# Patient Record
Sex: FEMALE | Race: ASIAN | Employment: FULL TIME | URBAN - METROPOLITAN AREA
[De-identification: names, ages, dates, MRNs, and addresses within clinical notes are randomized per-mention and may not be internally consistent; named-entity substitution may affect disease eponyms.]

---

## 2017-06-14 PROBLEM — E78.2 MIXED HYPERLIPIDEMIA: Status: ACTIVE | Noted: 2017-06-14

## 2017-06-14 PROBLEM — J45.20 MILD INTERMITTENT ASTHMA WITHOUT COMPLICATION: Status: ACTIVE | Noted: 2017-06-14

## 2017-06-14 PROCEDURE — 88175 CYTOPATH C/V AUTO FLUID REDO: CPT | Performed by: PHYSICIAN ASSISTANT

## 2017-06-14 PROCEDURE — 87591 N.GONORRHOEAE DNA AMP PROB: CPT | Performed by: PHYSICIAN ASSISTANT

## 2017-06-14 PROCEDURE — 87491 CHLMYD TRACH DNA AMP PROBE: CPT | Performed by: PHYSICIAN ASSISTANT

## 2017-06-14 PROCEDURE — 87624 HPV HI-RISK TYP POOLED RSLT: CPT | Performed by: PHYSICIAN ASSISTANT

## 2017-09-15 PROBLEM — M67.89 GANGLION AND CYST OF SYNOVIUM, TENDON AND BURSA: Status: ACTIVE | Noted: 2017-09-15

## 2017-09-15 PROBLEM — M23.92 INTERNAL DERANGEMENT OF LEFT KNEE: Status: ACTIVE | Noted: 2017-09-15

## 2017-09-15 PROBLEM — M67.49 GANGLION AND CYST OF SYNOVIUM, TENDON AND BURSA: Status: ACTIVE | Noted: 2017-09-15

## 2017-09-15 PROBLEM — M71.39 GANGLION AND CYST OF SYNOVIUM, TENDON AND BURSA: Status: ACTIVE | Noted: 2017-09-15

## 2017-10-06 PROBLEM — Z47.89 ORTHOPEDIC AFTERCARE: Status: ACTIVE | Noted: 2017-10-06

## 2018-01-26 PROCEDURE — 87081 CULTURE SCREEN ONLY: CPT | Performed by: PHYSICIAN ASSISTANT

## 2018-01-28 ENCOUNTER — HOSPITAL ENCOUNTER (EMERGENCY)
Facility: HOSPITAL | Age: 23
Discharge: HOME | End: 2018-01-28
Attending: EMERGENCY MEDICINE
Payer: COMMERCIAL

## 2018-01-28 ENCOUNTER — APPOINTMENT (OUTPATIENT)
Dept: GENERAL RADIOLOGY | Facility: HOSPITAL | Age: 23
End: 2018-01-28
Payer: COMMERCIAL

## 2018-01-28 VITALS
WEIGHT: 153 LBS | RESPIRATION RATE: 14 BRPM | BODY MASS INDEX: 30.04 KG/M2 | HEIGHT: 60 IN | DIASTOLIC BLOOD PRESSURE: 88 MMHG | HEART RATE: 102 BPM | SYSTOLIC BLOOD PRESSURE: 127 MMHG | TEMPERATURE: 99 F | OXYGEN SATURATION: 100 %

## 2018-01-28 DIAGNOSIS — J11.1 FLU SYNDROME: Primary | ICD-10-CM

## 2018-01-28 LAB
ADENOVIRUS PCR:: NEGATIVE
ALBUMIN SERPL-MCNC: 3.5 G/DL (ref 3.5–4.8)
ALP LIVER SERPL-CCNC: 139 U/L (ref 52–144)
ALT SERPL-CCNC: 65 U/L (ref 14–54)
AST SERPL-CCNC: 37 U/L (ref 15–41)
B PERT DNA SPEC QL NAA+PROBE: NEGATIVE
BASOPHILS # BLD AUTO: 0.02 X10(3) UL (ref 0–0.1)
BASOPHILS NFR BLD AUTO: 0.1 %
BILIRUB SERPL-MCNC: 0.6 MG/DL (ref 0.1–2)
BILIRUB UR QL STRIP.AUTO: NEGATIVE
BUN BLD-MCNC: 8 MG/DL (ref 8–20)
C PNEUM DNA SPEC QL NAA+PROBE: NEGATIVE
CALCIUM BLD-MCNC: 9.2 MG/DL (ref 8.3–10.3)
CHLORIDE: 100 MMOL/L (ref 101–111)
CLARITY UR REFRACT.AUTO: CLEAR
CO2: 25 MMOL/L (ref 22–32)
COLOR UR AUTO: YELLOW
CORONAVIRUS 229E PCR:: NEGATIVE
CORONAVIRUS HKU1 PCR:: NEGATIVE
CORONAVIRUS NL63 PCR:: NEGATIVE
CORONAVIRUS OC43 PCR:: NEGATIVE
CREAT BLD-MCNC: 0.67 MG/DL (ref 0.55–1.02)
EOSINOPHIL # BLD AUTO: 0 X10(3) UL (ref 0–0.3)
EOSINOPHIL NFR BLD AUTO: 0 %
ERYTHROCYTE [DISTWIDTH] IN BLOOD BY AUTOMATED COUNT: 12 % (ref 11.5–16)
FLUAV RNA SPEC QL NAA+PROBE: NEGATIVE
FLUBV RNA SPEC QL NAA+PROBE: NEGATIVE
GLUCOSE BLD-MCNC: 93 MG/DL (ref 70–99)
GLUCOSE UR STRIP.AUTO-MCNC: NEGATIVE MG/DL
HCT VFR BLD AUTO: 37.9 % (ref 34–50)
HGB BLD-MCNC: 13.1 G/DL (ref 12–16)
IMMATURE GRANULOCYTE COUNT: 0.04 X10(3) UL (ref 0–1)
IMMATURE GRANULOCYTE RATIO %: 0.3 %
KETONES UR STRIP.AUTO-MCNC: 20 MG/DL
LEUKOCYTE ESTERASE UR QL STRIP.AUTO: NEGATIVE
LYMPHOCYTES # BLD AUTO: 2.14 X10(3) UL (ref 0.9–4)
LYMPHOCYTES NFR BLD AUTO: 15.4 %
M PROTEIN MFR SERPL ELPH: 9 G/DL (ref 6.1–8.3)
MCH RBC QN AUTO: 31.1 PG (ref 27–33.2)
MCHC RBC AUTO-ENTMCNC: 34.6 G/DL (ref 31–37)
MCV RBC AUTO: 90 FL (ref 81–100)
METAPNEUMOVIRUS PCR:: NEGATIVE
MONOCYTES # BLD AUTO: 1.1 X10(3) UL (ref 0.1–0.6)
MONOCYTES NFR BLD AUTO: 7.9 %
MYCOPLASMA PNEUMONIA PCR:: NEGATIVE
NEUTROPHIL ABS PRELIM: 10.62 X10 (3) UL (ref 1.3–6.7)
NEUTROPHILS # BLD AUTO: 10.62 X10(3) UL (ref 1.3–6.7)
NEUTROPHILS NFR BLD AUTO: 76.3 %
NITRITE UR QL STRIP.AUTO: NEGATIVE
PARAINFLUENZA 1 PCR:: NEGATIVE
PARAINFLUENZA 2 PCR:: NEGATIVE
PARAINFLUENZA 3 PCR:: NEGATIVE
PARAINFLUENZA 4 PCR:: NEGATIVE
PH UR STRIP.AUTO: 6 [PH] (ref 4.5–8)
PLATELET # BLD AUTO: 368 10(3)UL (ref 150–450)
POCT URINE PREGNANCY: NEGATIVE
POTASSIUM SERPL-SCNC: 3.9 MMOL/L (ref 3.6–5.1)
PROT UR STRIP.AUTO-MCNC: NEGATIVE MG/DL
RBC # BLD AUTO: 4.21 X10(6)UL (ref 3.8–5.1)
RBC UR QL AUTO: NEGATIVE
RED CELL DISTRIBUTION WIDTH-SD: 38.5 FL (ref 35.1–46.3)
RHINOVIRUS/ENTERO PCR:: NEGATIVE
RSV RNA SPEC QL NAA+PROBE: NEGATIVE
SODIUM SERPL-SCNC: 135 MMOL/L (ref 136–144)
SP GR UR STRIP.AUTO: 1.01 (ref 1–1.03)
UROBILINOGEN UR STRIP.AUTO-MCNC: <2 MG/DL
WBC # BLD AUTO: 13.9 X10(3) UL (ref 4–13)

## 2018-01-28 PROCEDURE — 87798 DETECT AGENT NOS DNA AMP: CPT | Performed by: EMERGENCY MEDICINE

## 2018-01-28 PROCEDURE — 99283 EMERGENCY DEPT VISIT LOW MDM: CPT

## 2018-01-28 PROCEDURE — 87633 RESP VIRUS 12-25 TARGETS: CPT | Performed by: EMERGENCY MEDICINE

## 2018-01-28 PROCEDURE — 85025 COMPLETE CBC W/AUTO DIFF WBC: CPT | Performed by: EMERGENCY MEDICINE

## 2018-01-28 PROCEDURE — 99284 EMERGENCY DEPT VISIT MOD MDM: CPT

## 2018-01-28 PROCEDURE — 87581 M.PNEUMON DNA AMP PROBE: CPT | Performed by: EMERGENCY MEDICINE

## 2018-01-28 PROCEDURE — 81025 URINE PREGNANCY TEST: CPT

## 2018-01-28 PROCEDURE — 87486 CHLMYD PNEUM DNA AMP PROBE: CPT | Performed by: EMERGENCY MEDICINE

## 2018-01-28 PROCEDURE — 80053 COMPREHEN METABOLIC PANEL: CPT | Performed by: EMERGENCY MEDICINE

## 2018-01-28 PROCEDURE — 87999 UNLISTED MICROBIOLOGY PX: CPT

## 2018-01-28 PROCEDURE — 36415 COLL VENOUS BLD VENIPUNCTURE: CPT

## 2018-01-28 PROCEDURE — 81003 URINALYSIS AUTO W/O SCOPE: CPT | Performed by: EMERGENCY MEDICINE

## 2018-01-28 PROCEDURE — 71046 X-RAY EXAM CHEST 2 VIEWS: CPT | Performed by: EMERGENCY MEDICINE

## 2018-01-28 RX ORDER — ONDANSETRON 4 MG/1
4 TABLET, ORALLY DISINTEGRATING ORAL EVERY 8 HOURS PRN
Qty: 10 TABLET | Refills: 0 | Status: SHIPPED | OUTPATIENT
Start: 2018-01-28 | End: 2018-02-04

## 2018-01-28 RX ORDER — OSELTAMIVIR PHOSPHATE 75 MG/1
75 CAPSULE ORAL 2 TIMES DAILY
Qty: 10 CAPSULE | Refills: 0 | Status: SHIPPED | OUTPATIENT
Start: 2018-01-28 | End: 2018-02-02

## 2018-01-28 NOTE — ED PROVIDER NOTES
Patient Seen in: [facility] Emergency Department    History   Patient presents with:  Fever (infectious)    Stated Complaint: flu like symptoms    HPI    The patient is a 25-year-old female presenting to the emergency department due to flulike sympt LMP 01/02/2018 (Exact Date)   SpO2 100%   Breastfeeding? No   BMI 29.88 kg/m²         Physical Exam  General: Nontoxic well-developed, well-nourished, pleasant, conversant adult female who is alert and oriented in no distress but intermittently coughing. view results for these tests on the individual orders.    URINALYSIS WITH CULTURE REFLEX   POCT PREGNANCY, URINE   RESPIRATORY PANEL FLU EXPANDED       ED Course as of Jan 28 0328  ------------------------------------------------------------  Blood was obta

## 2018-01-28 NOTE — ED INITIAL ASSESSMENT (HPI)
Pt arrives with flu like symptoms since Thursday, +fevers, chills, body aches, nausea and vomiting. Last dose ibuprofen 200mg at 2130.

## 2018-03-19 PROCEDURE — 87147 CULTURE TYPE IMMUNOLOGIC: CPT | Performed by: NURSE PRACTITIONER

## 2018-03-19 PROCEDURE — 87081 CULTURE SCREEN ONLY: CPT | Performed by: NURSE PRACTITIONER

## 2018-05-17 PROCEDURE — 87081 CULTURE SCREEN ONLY: CPT | Performed by: NURSE PRACTITIONER

## 2018-10-03 PROCEDURE — 87491 CHLMYD TRACH DNA AMP PROBE: CPT | Performed by: FAMILY MEDICINE

## 2018-10-03 PROCEDURE — 87591 N.GONORRHOEAE DNA AMP PROB: CPT | Performed by: FAMILY MEDICINE

## 2018-11-02 PROBLEM — R59.0 CERVICAL LYMPHADENOPATHY: Status: ACTIVE | Noted: 2018-11-02

## 2018-11-02 PROBLEM — R22.1 MASS OF RIGHT SIDE OF NECK: Status: ACTIVE | Noted: 2018-11-02

## 2018-11-07 PROCEDURE — 88307 TISSUE EXAM BY PATHOLOGIST: CPT | Performed by: OTOLARYNGOLOGY

## 2018-11-07 PROCEDURE — 88342 IMHCHEM/IMCYTCHM 1ST ANTB: CPT | Performed by: OTOLARYNGOLOGY

## 2018-11-07 PROCEDURE — 88185 FLOWCYTOMETRY/TC ADD-ON: CPT | Performed by: OTOLARYNGOLOGY

## 2018-11-07 PROCEDURE — 88184 FLOWCYTOMETRY/ TC 1 MARKER: CPT | Performed by: OTOLARYNGOLOGY

## 2018-11-07 PROCEDURE — 88341 IMHCHEM/IMCYTCHM EA ADD ANTB: CPT | Performed by: OTOLARYNGOLOGY

## 2019-01-22 ENCOUNTER — APPOINTMENT (OUTPATIENT)
Dept: OTHER | Facility: HOSPITAL | Age: 24
End: 2019-01-22
Attending: PREVENTIVE MEDICINE

## 2019-01-25 ENCOUNTER — APPOINTMENT (OUTPATIENT)
Dept: OTHER | Facility: HOSPITAL | Age: 24
End: 2019-01-25
Attending: PREVENTIVE MEDICINE

## 2019-04-30 PROBLEM — M70.62 TROCHANTERIC BURSITIS OF LEFT HIP: Status: ACTIVE | Noted: 2019-04-30

## 2019-05-07 ENCOUNTER — HOSPITAL ENCOUNTER (OUTPATIENT)
Dept: CT IMAGING | Facility: HOSPITAL | Age: 24
Discharge: HOME | End: 2019-05-07
Attending: PREVENTIVE MEDICINE
Payer: OTHER MISCELLANEOUS

## 2019-05-07 ENCOUNTER — OFFICE VISIT (OUTPATIENT)
Dept: OTHER | Facility: HOSPITAL | Age: 24
End: 2019-05-07
Attending: PREVENTIVE MEDICINE
Payer: OTHER MISCELLANEOUS

## 2019-05-07 DIAGNOSIS — Z87.820 SIGNIFICANT CLOSED HEAD TRAUMA WITHIN PAST 3 MONTHS: Primary | ICD-10-CM

## 2019-05-07 PROCEDURE — 70486 CT MAXILLOFACIAL W/O DYE: CPT | Performed by: PREVENTIVE MEDICINE

## 2019-05-07 PROCEDURE — 76377 3D RENDER W/INTRP POSTPROCES: CPT | Performed by: PREVENTIVE MEDICINE

## 2019-05-07 PROCEDURE — 70450 CT HEAD/BRAIN W/O DYE: CPT | Performed by: PREVENTIVE MEDICINE

## 2019-05-07 NOTE — PATIENT INSTRUCTIONS
Rest, eat lightly    Ice to face or head as needed  ( 20 minutes at a time)    Off work until [employer] injury instructions:      Head Injury (Adult)    You have a head injury. It does not appear serious at this time.  But symptoms of a more serious ? Don’t return to sports or other activities that could result in another head injury. Follow-up care  Follow up with your healthcare provider, or as directed. If imaging tests were done, they will be reviewed by a doctor.  You will be told the results and · Inability to be awakened  · Stiff neck  · Weakness or numbness in any part of the body  · Seizures  General care  · If you were prescribed medicines for pain, use them as directed.  Note: Don’t take other medicines for pain without talking to your provide © 3304-3675 [employer] [address] [facility] [place] [place]. All rights reserved. This information is not intended as a substitute for professional medical care. Always follow your healthcare professional's instructions.

## 2019-05-08 ENCOUNTER — APPOINTMENT (OUTPATIENT)
Dept: OTHER | Facility: HOSPITAL | Age: 24
End: 2019-05-08
Attending: PREVENTIVE MEDICINE
Payer: OTHER MISCELLANEOUS

## 2019-05-10 ENCOUNTER — APPOINTMENT (OUTPATIENT)
Dept: OTHER | Facility: HOSPITAL | Age: 24
End: 2019-05-10
Attending: FAMILY MEDICINE
Payer: OTHER MISCELLANEOUS

## 2019-05-15 ENCOUNTER — APPOINTMENT (OUTPATIENT)
Dept: OTHER | Facility: HOSPITAL | Age: 24
End: 2019-05-15
Attending: FAMILY MEDICINE
Payer: OTHER MISCELLANEOUS

## 2019-05-22 ENCOUNTER — APPOINTMENT (OUTPATIENT)
Dept: OTHER | Facility: HOSPITAL | Age: 24
End: 2019-05-22
Attending: PREVENTIVE MEDICINE
Payer: OTHER MISCELLANEOUS

## 2019-11-12 ENCOUNTER — OFFICE VISIT (OUTPATIENT)
Dept: OTHER | Facility: HOSPITAL | Age: 24
End: 2019-11-12
Attending: PREVENTIVE MEDICINE
Payer: OTHER MISCELLANEOUS

## 2019-11-12 ENCOUNTER — HOSPITAL ENCOUNTER (OUTPATIENT)
Dept: GENERAL RADIOLOGY | Facility: HOSPITAL | Age: 24
Discharge: HOME | End: 2019-11-12
Attending: PREVENTIVE MEDICINE
Payer: OTHER MISCELLANEOUS

## 2019-11-12 DIAGNOSIS — S60.219A WRIST CONTUSION: Primary | ICD-10-CM

## 2019-11-12 DIAGNOSIS — Z01.84 IMMUNITY STATUS TESTING: ICD-10-CM

## 2019-11-12 PROCEDURE — 86706 HEP B SURFACE ANTIBODY: CPT

## 2019-11-12 PROCEDURE — 73110 X-RAY EXAM OF WRIST: CPT | Performed by: PREVENTIVE MEDICINE

## 2019-11-14 ENCOUNTER — APPOINTMENT (OUTPATIENT)
Dept: OTHER | Facility: HOSPITAL | Age: 24
End: 2019-11-14
Attending: PREVENTIVE MEDICINE
Payer: OTHER MISCELLANEOUS

## 2019-12-26 ENCOUNTER — OFFICE VISIT (OUTPATIENT)
Dept: OTHER | Facility: HOSPITAL | Age: 24
End: 2019-12-26
Attending: PREVENTIVE MEDICINE
Payer: OTHER MISCELLANEOUS

## 2019-12-26 DIAGNOSIS — Z01.84 IMMUNITY STATUS TESTING: Primary | ICD-10-CM

## 2019-12-26 PROCEDURE — 86706 HEP B SURFACE ANTIBODY: CPT

## 2019-12-27 ENCOUNTER — APPOINTMENT (OUTPATIENT)
Dept: OTHER | Facility: HOSPITAL | Age: 24
End: 2019-12-27
Attending: PREVENTIVE MEDICINE

## 2020-01-22 ENCOUNTER — HOSPITAL ENCOUNTER (OUTPATIENT)
Dept: GENERAL RADIOLOGY | Facility: HOSPITAL | Age: 25
Discharge: HOME | End: 2020-01-22
Attending: PREVENTIVE MEDICINE

## 2020-01-22 ENCOUNTER — OFFICE VISIT (OUTPATIENT)
Dept: OTHER | Facility: HOSPITAL | Age: 25
End: 2020-01-22
Attending: PREVENTIVE MEDICINE

## 2020-01-22 DIAGNOSIS — M25.552 ACUTE HIP PAIN, LEFT: Primary | ICD-10-CM

## 2020-01-22 PROCEDURE — 73502 X-RAY EXAM HIP UNI 2-3 VIEWS: CPT | Performed by: PREVENTIVE MEDICINE

## 2020-01-24 ENCOUNTER — OCC HEALTH (OUTPATIENT)
Dept: OTHER | Facility: HOSPITAL | Age: 25
End: 2020-01-24
Attending: PREVENTIVE MEDICINE

## 2020-01-24 RX ORDER — HYDROCODONE BITARTRATE AND ACETAMINOPHEN 5; 325 MG/1; MG/1
TABLET ORAL
Qty: 15 TABLET | Refills: 0 | Status: SHIPPED | OUTPATIENT
Start: 2020-01-24 | End: 2020-08-17

## 2020-01-28 PROBLEM — S76.012A STRAIN OF LEFT HIP, INITIAL ENCOUNTER: Status: ACTIVE | Noted: 2020-01-28

## 2020-03-04 PROBLEM — Z47.89 ORTHOPEDIC AFTERCARE: Status: RESOLVED | Noted: 2017-10-06 | Resolved: 2020-03-04

## 2020-03-04 PROBLEM — S76.012A STRAIN OF LEFT HIP, INITIAL ENCOUNTER: Status: RESOLVED | Noted: 2020-01-28 | Resolved: 2020-03-04

## 2020-05-27 ENCOUNTER — APPOINTMENT (OUTPATIENT)
Dept: OTHER | Facility: HOSPITAL | Age: 25
End: 2020-05-27
Attending: FAMILY MEDICINE

## 2020-06-24 ENCOUNTER — OFFICE VISIT (OUTPATIENT)
Dept: OTHER | Facility: HOSPITAL | Age: 25
End: 2020-06-24
Attending: FAMILY MEDICINE

## 2020-06-24 DIAGNOSIS — Z01.84 IMMUNITY STATUS TESTING: Primary | ICD-10-CM

## 2020-06-24 PROCEDURE — 86706 HEP B SURFACE ANTIBODY: CPT

## 2021-03-31 ENCOUNTER — ORDER TRANSCRIPTION (OUTPATIENT)
Dept: ADMINISTRATIVE | Facility: HOSPITAL | Age: 26
End: 2021-03-31

## 2021-03-31 DIAGNOSIS — Z11.59 SCREENING EXAMINATION FOR ARTHROPOD-BORNE VIRAL DISEASE: ICD-10-CM

## 2021-03-31 DIAGNOSIS — Z01.818 PRE-OP TESTING: Primary | ICD-10-CM

## 2021-04-10 ENCOUNTER — LAB ENCOUNTER (OUTPATIENT)
Dept: LAB | Age: 26
End: 2021-04-10
Attending: OTOLARYNGOLOGY
Payer: COMMERCIAL

## 2021-04-10 DIAGNOSIS — Z01.818 PRE-OP TESTING: ICD-10-CM

## 2021-04-10 DIAGNOSIS — Z11.59 SCREENING EXAMINATION FOR ARTHROPOD-BORNE VIRAL DISEASE: ICD-10-CM

## 2021-04-13 ENCOUNTER — NURSE ONLY (OUTPATIENT)
Dept: LAB | Facility: HOSPITAL | Age: 26
End: 2021-04-13
Attending: OTOLARYNGOLOGY
Payer: COMMERCIAL

## 2021-04-13 ENCOUNTER — HOSPITAL ENCOUNTER (OUTPATIENT)
Dept: ULTRASOUND IMAGING | Facility: HOSPITAL | Age: 26
Discharge: HOME | End: 2021-04-13
Attending: OTOLARYNGOLOGY
Payer: COMMERCIAL

## 2021-04-13 DIAGNOSIS — R69 DIAGNOSIS UNKNOWN: Primary | ICD-10-CM

## 2021-04-13 DIAGNOSIS — R22.1 MASS OF RIGHT SIDE OF NECK: ICD-10-CM

## 2021-04-13 PROCEDURE — 88184 FLOWCYTOMETRY/ TC 1 MARKER: CPT

## 2021-04-13 PROCEDURE — 88173 CYTOPATH EVAL FNA REPORT: CPT | Performed by: OTOLARYNGOLOGY

## 2021-04-13 PROCEDURE — 88185 FLOWCYTOMETRY/TC ADD-ON: CPT

## 2021-04-13 PROCEDURE — 88305 TISSUE EXAM BY PATHOLOGIST: CPT | Performed by: OTOLARYNGOLOGY

## 2021-04-13 PROCEDURE — 88172 CYTP DX EVAL FNA 1ST EA SITE: CPT | Performed by: OTOLARYNGOLOGY

## 2021-04-13 PROCEDURE — 10005 FNA BX W/US GDN 1ST LES: CPT | Performed by: OTOLARYNGOLOGY

## 2021-04-28 ENCOUNTER — OCC HEALTH (OUTPATIENT)
Dept: OCCUPATIONAL MEDICINE | Age: 26
End: 2021-04-28
Attending: FAMILY MEDICINE

## 2021-04-28 ENCOUNTER — HOSPITAL ENCOUNTER (OUTPATIENT)
Dept: GENERAL RADIOLOGY | Age: 26
Discharge: HOME | End: 2021-04-28
Attending: FAMILY MEDICINE

## 2021-04-28 DIAGNOSIS — S99.911A RIGHT ANKLE INJURY, INITIAL ENCOUNTER: ICD-10-CM

## 2021-04-28 DIAGNOSIS — S99.921A RIGHT FOOT INJURY, INITIAL ENCOUNTER: ICD-10-CM

## 2021-04-28 DIAGNOSIS — S99.921A RIGHT FOOT INJURY, INITIAL ENCOUNTER: Primary | ICD-10-CM

## 2021-04-28 PROCEDURE — 73630 X-RAY EXAM OF FOOT: CPT | Performed by: FAMILY MEDICINE

## 2021-04-28 PROCEDURE — 73610 X-RAY EXAM OF ANKLE: CPT | Performed by: FAMILY MEDICINE

## 2021-05-12 ENCOUNTER — OFFICE VISIT (OUTPATIENT)
Dept: OCCUPATIONAL MEDICINE | Age: 26
End: 2021-05-12
Attending: FAMILY MEDICINE

## 2021-09-27 PROBLEM — R22.1 MASS OF RIGHT SIDE OF NECK: Status: RESOLVED | Noted: 2018-11-02 | Resolved: 2021-09-27

## 2021-09-27 PROBLEM — M23.92 INTERNAL DERANGEMENT OF LEFT KNEE: Status: RESOLVED | Noted: 2017-09-15 | Resolved: 2021-09-27

## 2021-10-19 ENCOUNTER — APPOINTMENT (OUTPATIENT)
Dept: CT IMAGING | Facility: HOSPITAL | Age: 26
End: 2021-10-19
Attending: EMERGENCY MEDICINE
Payer: COMMERCIAL

## 2021-10-19 ENCOUNTER — APPOINTMENT (OUTPATIENT)
Dept: GENERAL RADIOLOGY | Facility: HOSPITAL | Age: 26
End: 2021-10-19
Payer: COMMERCIAL

## 2021-10-19 ENCOUNTER — HOSPITAL ENCOUNTER (EMERGENCY)
Facility: HOSPITAL | Age: 26
Discharge: HOME | End: 2021-10-19
Attending: EMERGENCY MEDICINE
Payer: COMMERCIAL

## 2021-10-19 VITALS
DIASTOLIC BLOOD PRESSURE: 98 MMHG | RESPIRATION RATE: 22 BRPM | SYSTOLIC BLOOD PRESSURE: 148 MMHG | HEIGHT: 59 IN | OXYGEN SATURATION: 100 % | WEIGHT: 160 LBS | BODY MASS INDEX: 32.25 KG/M2 | HEART RATE: 60 BPM

## 2021-10-19 DIAGNOSIS — R07.9 CHEST PAIN OF UNCERTAIN ETIOLOGY: Primary | ICD-10-CM

## 2021-10-19 DIAGNOSIS — R79.89 ELEVATED LFTS: ICD-10-CM

## 2021-10-19 PROCEDURE — 71275 CT ANGIOGRAPHY CHEST: CPT | Performed by: EMERGENCY MEDICINE

## 2021-10-19 PROCEDURE — 85379 FIBRIN DEGRADATION QUANT: CPT | Performed by: EMERGENCY MEDICINE

## 2021-10-19 PROCEDURE — 99285 EMERGENCY DEPT VISIT HI MDM: CPT

## 2021-10-19 PROCEDURE — 84484 ASSAY OF TROPONIN QUANT: CPT | Performed by: EMERGENCY MEDICINE

## 2021-10-19 PROCEDURE — 71045 X-RAY EXAM CHEST 1 VIEW: CPT | Performed by: EMERGENCY MEDICINE

## 2021-10-19 PROCEDURE — 93010 ELECTROCARDIOGRAM REPORT: CPT

## 2021-10-19 PROCEDURE — 96374 THER/PROPH/DIAG INJ IV PUSH: CPT

## 2021-10-19 PROCEDURE — 85025 COMPLETE CBC W/AUTO DIFF WBC: CPT | Performed by: EMERGENCY MEDICINE

## 2021-10-19 PROCEDURE — 80053 COMPREHEN METABOLIC PANEL: CPT | Performed by: EMERGENCY MEDICINE

## 2021-10-19 PROCEDURE — 93005 ELECTROCARDIOGRAM TRACING: CPT

## 2021-10-19 RX ORDER — KETOROLAC TROMETHAMINE 30 MG/ML
15 INJECTION, SOLUTION INTRAMUSCULAR; INTRAVENOUS ONCE
Status: COMPLETED | OUTPATIENT
Start: 2021-10-19 | End: 2021-10-19

## 2021-10-19 NOTE — ED PROVIDER NOTES
CT ANGIOGRAPHY, CHEST (CPT=71275)    Result Date: 10/19/2021  CONCLUSION:   No evidence of pulmonary embolus or acute cardiopulmonary process.    Dictated by (CST): Beatriz Kowalski MD on 10/19/2021 at 3:40 PM     Finalized by (CST): Beatriz Kowalski MD on 10/19/

## 2021-10-19 NOTE — ED PROVIDER NOTES
Patient Seen in: [facility] Emergency Department      History   Patient presents with:  Chest Pain Angina  Difficulty Breathing    Stated Complaint: shortness of breath with chest pain and headache    Subjective:   HPI    Patient is a 59-year-old fe HEENT: Normocephalic, atraumatic. Moist mucous membranes. Pupils equal round reactive to light and accommodation, extraocular motion is intact, sclerae white, conjunctiva is pink. Oropharynx is unremarkable, no exudate.   NECK: Supple, trachea midline, 133  EKG    Rate, intervals and axes as noted on EKG Report.   Rate: 80  Rhythm: Sinus Rhythm  Reading: No acute changes              CT ANGIOGRAPHY, CHEST (CPT=71275)    Result Date: 10/19/2021  PROCEDURE:  CT ANGIOGRAPHY, CHEST (CPT=71275)  COMPARISON:  N 85 at TL  PATIENT STATED HISTORY: (As transcribed by Technologist)  Patient offered no additional history at this time. FINDINGS:  Normal heart size and pulmonary vascularity. No pleural effusion or pneumothorax. No lobar consolidation.             CONCL week            Medications Prescribed:  Discharge Medication List as of 10/19/2021  4:10 PM

## 2021-10-19 NOTE — ED INITIAL ASSESSMENT (HPI)
Pt c/o constant sharp left sided chest pain since last night. Pt States BP has also been high, reading at home was 140/100. Pt also c/o exertional dyspnea.

## 2022-05-13 ENCOUNTER — HOSPITAL ENCOUNTER (EMERGENCY)
Facility: HOSPITAL | Age: 27
Discharge: HOME | End: 2022-05-13
Attending: EMERGENCY MEDICINE
Payer: OTHER MISCELLANEOUS

## 2022-05-13 ENCOUNTER — APPOINTMENT (OUTPATIENT)
Dept: GENERAL RADIOLOGY | Facility: HOSPITAL | Age: 27
End: 2022-05-13
Payer: OTHER MISCELLANEOUS

## 2022-05-13 VITALS
SYSTOLIC BLOOD PRESSURE: 136 MMHG | WEIGHT: 160 LBS | RESPIRATION RATE: 18 BRPM | DIASTOLIC BLOOD PRESSURE: 89 MMHG | OXYGEN SATURATION: 100 % | TEMPERATURE: 98 F | BODY MASS INDEX: 32.25 KG/M2 | HEIGHT: 59 IN | HEART RATE: 72 BPM

## 2022-05-13 DIAGNOSIS — S82.891A CLOSED AVULSION FRACTURE OF RIGHT ANKLE, INITIAL ENCOUNTER: Primary | ICD-10-CM

## 2022-05-13 DIAGNOSIS — S99.911A INJURY OF RIGHT ANKLE, INITIAL ENCOUNTER: ICD-10-CM

## 2022-05-13 PROCEDURE — 73610 X-RAY EXAM OF ANKLE: CPT

## 2022-05-13 PROCEDURE — 99284 EMERGENCY DEPT VISIT MOD MDM: CPT

## 2022-05-13 RX ORDER — IBUPROFEN 600 MG/1
600 TABLET ORAL ONCE
Status: COMPLETED | OUTPATIENT
Start: 2022-05-13 | End: 2022-05-13

## 2022-05-13 RX ORDER — IBUPROFEN 600 MG/1
600 TABLET ORAL EVERY 8 HOURS PRN
Qty: 30 TABLET | Refills: 0 | Status: SHIPPED | OUTPATIENT
Start: 2022-05-13 | End: 2022-05-20

## 2022-05-13 RX ORDER — ACETAMINOPHEN AND CODEINE PHOSPHATE 300; 30 MG/1; MG/1
1-2 TABLET ORAL EVERY 6 HOURS PRN
Qty: 10 TABLET | Refills: 0 | Status: SHIPPED | OUTPATIENT
Start: 2022-05-13 | End: 2022-05-18

## 2024-11-01 NOTE — DISCHARGE INSTRUCTIONS
Understanding Preeclampsia  Preeclampsia is a condition that can happen in pregnancy. It includes high blood pressure (hypertension), swelling, and signs of organ problems. It can show up around week 20 of pregnancy. It often goes away by 12 weeks after you give birth. It can lead to serious health risks for you and your baby. During your pregnancy, your healthcare provider will watch your blood pressure.     Your blood pressure will be monitored regularly throughout your pregnancy to help check for preeclampsia.     Dangers of preeclampsia   If not treated, preeclampsia can cause problems for you and your baby. The placenta is the organ that nourishes your baby. It may tear away from the wall of the uterus. This can put the baby at risk for health problems (fetal distress). It can put the baby at risk for  birth. Preeclampsia can also cause these health problems in you:  Kidney failure or other organ damage  Seizures  Stroke  Who’s at risk for preeclampsia?   No one knows what causes preeclampsia. It can happen in any pregnant person. But there are things that increase your risk. You may need to take a daily low dose of aspirin if you are at risk for preeclampsia.  You’re at higher risk for preeclampsia if you have any of these:  Diabetes  High blood pressure  Obesity  Kidney disease  Autoimmune disease such as lupus  A family history of preeclampsia  You’re at higher risk if any of these apply to you:  This is your first pregnancy  You are having twins or more  You’re under age 18 or over age 40  You used in vitro fertilization  You are Black  And you’re at higher risk if you had any of these in a past pregnancy:  Preeclampsia  Intrauterine growth restriction (IUGR)   birth  Placental abruption  Fetal death  Symptoms  A common symptom of preeclampsia is high blood pressure. Other symptoms may include:  Fast weight gain  Protein in your urine  Headache  Belly (abdominal) pain on your right  side  Vision problems such as flashes or spots  Swelling (edema) in your face or hands (this often happens near the end of a normal pregnancy)  Tests you may have  Your healthcare provider will want to check your blood pressure. This will need to be done often in your pregnancy. If your blood pressure is high, you may have these tests:  Urine tests to look for protein  Blood tests to confirm preeclampsia  Fetal monitoring to make sure that your baby is healthy  Treating preeclampsia  Preeclampsia almost always ends soon after you give birth. Until then, your healthcare provider can help you manage it.  If your symptoms are mild, you may to:     Limit your activity  Rest in bed  Not do heavy lifting     If your symptoms are severe, you will stay in the hospital. Treatment here may include:  Limits to your activity. This is to help control your blood pressure. You should not lift anything heavy. You will need to spend 8 hours a day lying down with your feet up.  Magnesium IV (intravenous) drip. This is done during labor. It's to prevent seizures  Induced labor or  section.  When to call your healthcare provider  Call your healthcare provider if your symptoms start quickly or are severe. This includes swelling, weight gain, or other symptoms. Some cases of preeclampsia are more severe than others. Your symptoms also may change or get worse as you get closer to your due date.  Once you give birth  In most cases, preeclampsia goes away on its own soon after you give birth. This is often by the 12th week after you deliver. Within days after you give birth, your blood pressure, swelling, and other symptoms should get better. But for some people, problems from preeclampsia can continue after birth.  Postpartum preeclampsia   Preeclampsia that starts after birth is rare. There are 2 types:  Postpartum preeclampsia. This may start in the first 48 hours after birth.  Late-onset preeclampsia. This starts more than 48  hours after birth.  Both of these types are rare. But call your healthcare provider right away if you have symptoms of preeclampsia after you give birth.  How daily issues affect your health  Many things in your daily life impact your health. This can include transportation, money problems, housing, access to food, and . If you can’t get to medical appointments, you may not receive the care you need. When money is tight, it may be difficult to pay for medicines. And living far from a grocery store can make it hard to buy healthy food.  If you have concerns in any of these or other areas, talk with your healthcare team. They may know of local resources to assist you. Or they may have a staff person who can help.  Noosh last reviewed this educational content on 10/1/2021  © 0486-5995 [employer]. All rights reserved. This information is not intended as a substitute for professional medical care. Always follow your healthcare professional's instructions.      Discharge Instructions    Diet: Regular  Activity: Normal activity         General Instructions    Call your OB doctor if: Fluid leaking from your vagina;Decrease in fetal movement;Vaginal or rectal pressure;Uterine contractions 10 minutes or closer for 1 to 2 hours;Vaginal bleeding;Temperature greater than 100F;Uterine contractions increasing in intensity and frequency  Early labor comfort measures: Relax, sleep, take a warm bath or shower for 30 minutes or less;Drink fluids and eat small light meals;Take a walk

## 2024-11-01 NOTE — NST
Nonstress Test   Patient: Aury Abarca    Gestation: 24w1d    NST:       Variability: Moderate           Accelerations: Yes           Decelerations: None            Baseline: 145 BPM           Uterine Irritability: No           Contractions: Not present                                        Acoustic Stimulator: No           Nonstress Test Interpretation: Appropriate for gestational age           Nonstress Test Second Interpretation: Appropriate for gestational age                     Additional Comments      Physician Evaluation      NST Interpretation: Appropriate for gestational age    Disposition:   Discharged    Comments:        Millie Contreras DO

## 2024-11-01 NOTE — PROGRESS NOTES
Pt is a 29 year old female admitted to [facility]5/[facility]-A.     Chief Complaint   Patient presents with    R/o Preeclampsia     Pt 140/90's at home. Pt c/o PERDOMO, did not take Tylenol. Positive fetal movement. Denies contractions, LOF and/or vaginal bleeding.       Pt is  24w1d intra-uterine pregnancy.  History obtained. Oriented to room, staff, and plan of care.

## 2024-12-03 NOTE — DISCHARGE INSTRUCTIONS
Discharge Instructions    Diet: regular            General Instructions    Call your OB doctor if: Fluid leaking from your vagina;Uterine contractions increasing in intensity and frequency;Vaginal bleeding;Vaginal or rectal pressure;Temperature greater than 100F;Decrease in fetal movement;Uterine contractions 10 minutes or closer for 1 to 2 hours

## 2024-12-03 NOTE — PROGRESS NOTES
Updated  on pt BP , PIH labs WNL, NST reactive, orders received to discharge pt home and follow up with OB for the next scheduled appointment, Discharge instruction given, pt going home in stable condition accompanied with MIL, pt not in active labor on discharge.

## 2024-12-03 NOTE — PROGRESS NOTES
Pt is a 29 year old female admitted to 118/118-A.     Chief Complaint   Patient presents with    Fainting    R/o Preeclampsia      Pt is  28w5d intra-uterine pregnancy.  History obtained, consents signed. Oriented to room, staff, and plan of care.  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 School health nurse checked her BP and twice systolic BP in 160's  Pt say she is having rt orbital pressure and headache , no other PIH symptoms   BP cuff cycling every 15 min, PIH labs drawn and sent to lab.  Will update  on the labs and pt BP soon

## 2024-12-03 NOTE — NST
Nonstress Test   Patient: Aury Abarca    Gestation: 28w5d    NST:       Variability: Moderate           Accelerations: Yes           Decelerations: None            Baseline: 140 BPM           Uterine Irritability: No           Contractions: Not present                                        Acoustic Stimulator: No           Nonstress Test Interpretation: Appropriate for gestational age           Nonstress Test Second Interpretation: Appropriate for gestational age                     Additional Comments    Physician Evaluation      NST Interpretation: Reactive    Disposition:   Discharged    Comments:        Rubi Boogie MD

## 2024-12-06 NOTE — CONSULTS
[facility]   part of [facility]    Maternal-Fetal Medicine Inpatient Consultation    Date of Admission:  2024  Date of Consult:  2024    Reason for Consult:   Accelerated hypertension    History of Present Illness:  Aury Abarca is a a(n) 29 year old female  with an IUP at 29w1d and an KIANA of Estimated Date of Delivery: 25 who  presents with accelerated hypertension..    The patient has noted more labile blood pressures in the middle of the day.  Yesterday she reported blood pressures in the 160s /110's ;  she took her labetalol dose early and her blood pressure control improved.  She also complained of a headache at that time.    In light of these concerns I advised inpatient observation with frequent blood pressure assessments as well as a laboratory workup to rule out superimposed preeclampsia.    At the present time the patient denies headache, blurred vision or right upper quadrant pain.  She denies uterine contractions, vaginal bleeding or leakage of fluid.      Review of History:      OB History:    OB History    Para Term  AB Living   2 0 0 0 1 0   SAB IAB Ectopic Multiple Live Births   0 0 1 0 0      # Outcome Date GA Lbr Greg/2nd Weight Sex Type Anes PTL Lv   2 Current            1 Ectopic 2023               Other Relevant History:  Past Medical History:    Asthma    Exersice Induced Asthma    ASTHMA    Asthma (HCC)    Essential hypertension    Infertility, female    Polycystic ovary syndrome     per (Dr. Cuevas)     Past Surgical History:   Procedure Laterality Date    Salpingectomy Right 2023     Family History   Problem Relation Age of Onset    Lipids Father     Diabetes Father     Heart Disorder Mother     Lipids Mother     Hypertension Mother     Hypertension Maternal Grandfather     Heart Disorder Maternal Grandfather     Lipids Maternal Grandfather     Other (Other) Maternal Grandfather         Kidney Disease     Hypertension Maternal Grandmother     Diabetes Paternal Grandmother     Heart Disorder Paternal Grandmother     Hypertension Paternal Grandmother     Lipids Paternal Grandmother     Heart Disease Paternal Grandmother         stroke    High Blood Pressure Paternal Grandmother     Heart Disorder Paternal Grandfather     Hypertension Paternal Grandfather     Lipids Paternal Grandfather     High Blood Pressure Paternal Grandfather     High Cholesterol Paternal Grandfather       reports that she has never smoked. She has never used smokeless tobacco. She reports that she does not currently use alcohol. She reports that she does not use drugs.    Allergies:  Allergies[1]    Medications:    Current Facility-Administered Medications:     acetaminophen (Tylenol Extra Strength) tab 500 mg, 500 mg, Oral, Q6H PRN **OR** acetaminophen (Tylenol Extra Strength) tab 1,000 mg, 1,000 mg, Oral, Q6H PRN    zolpidem (Ambien) tab 5 mg, 5 mg, Oral, Nightly PRN    docusate sodium (Colace) cap 100 mg, 100 mg, Oral, BID    calcium carbonate (Tums) chewable tab 1,000 mg, 1,000 mg, Oral, Daily PRN    prenatal vitamin with DHA (PNV with DHA) cap 1 capsule, 1 capsule, Oral, Daily    Physical Exam:  Temp:  [97.9 °F (36.6 °C)-98.2 °F (36.8 °C)] 97.9 °F (36.6 °C)  Pulse:  [80-86] 80  Resp:  [16-18] 18  BP: (125-141)/(68-89) 141/83    Gen: No acute distress, alert and oriented x3  Abd: Gravid abdomen, soft, nontender, non-distended  Cervix: deffered  Ext: SCD's in place    Laboratory Data:  Lab Results   Component Value Date    WBC 10.9 12/05/2024    HGB 11.2 12/05/2024    HCT 31.9 12/05/2024    .0 12/05/2024    CREATSERUM 0.55 12/05/2024    BUN 9 12/05/2024     12/05/2024    K 3.9 12/05/2024     12/05/2024    CO2 24.0 12/05/2024     12/05/2024    CA 9.9 12/05/2024    ALB 3.7 12/05/2024    ALKPHO 81 12/05/2024    BILT 0.2 12/05/2024    TP 6.8 12/05/2024    AST 21 12/05/2024    ALT 18 12/05/2024       FETAL STATUS:  FHRT  (12/5/24 - ~ 2100): 135 baseline, moderate variability, Appropriate for gestational age, decelerations: none  Uterine Contractions: none    Ultrasound:  Ultrasound Findings:  Single IUP in cephalic presentation.    Placenta is anterior.   A 3 vessel cord is noted.  Cardiac activity is present at 143 bpm  MVP is 3.5 cm . FABY 11.8 cm  BPP is 8/8.      NARRATIVE:   ACCELERATED HYPERTENSION VS  SUPERIMPOSED PREECLAMPSIA  This patient has type 2 diabetes managed on diet as well as chronic hypertension.  She has required doses adjustments and now is on 400 mg of labetalol twice daily.  She has noted midday accelerated hypertension.  She reports that she took her p.m. dose earlier yesterday and her blood pressures improved.    Also of note, she was seen 3 days ago and her protein/creatinine ratio was too low to calculate; yesterday evening her protein creatinine ratio increased to 0.22.    I reviewed that the patient has significant risk factors for development of preeclampsia (diabetes, pre-existing hypertension, nulliparous status).  In light of the elevated protein/creatinine ratio I am advising a 24-hour urine collection at the present time.    In addition, advising close serial blood pressure assessments while she is an inpatient (every 2 hours while awake, every 4 hours during sleep).  I am also suggesting that the bedside nurse calibrates her home blood pressure cuff which she brought with her.    If the patient has preeclampsia (24 urine greater than 300 mg) I would advise a course of corticosteroids and further inpatient assessment to ensure she does not exhibit severe features of preeclampsia.  She does not have preeclampsia we will continue close blood pressure surveillance and make appropriate medication adjustments as needed.    IMPRESSION:   IUP at 29w1d  Pre-existing hypertension   Protein/creatinine ratio increased from 2 low to calculate 0.22 to over 2 days  reported midday severe BP elevations at  home  Currently on labetalol 400 mg twice daily  Type 2 diabetes managed on diet: Good control    RECOMMENDATIONS:   Continue ADA diet  Continue daily fasting and 2-hour postprandial glucose assessments  Collect 24-hour urine for total protein determination  Continue labetalol 400 mg twice daily for now  If patient has preeclampsia (24-hour urine greater than 300 mg)   Consider further inpatient observation to rule out severe features - (likely though the weekend)  Administer a course of betamethasone - may need supplemental sliding scale insulin  Repeat serum labs to rule out sever lab features of preeclampsia  Continue blood pressure observation (every 2 hours while awake, every 4 hours while sleeping) for the next 24  If patient does not have preeclampsia  Continue blood pressure observation (every 2 hours while awake, every 4 hours while sleeping) for the next 24 hours  Make dosage adjustments to antihypertensive regimen if breakthrough hypertension noted; may consider labetalol 400 mg 3 times daily  If blood pressure control is stabilized, may consider outpatient management    Scott Olguin M.D.  12/6/2024  8:21 AM    Thank you for allowing me to participate in the care of your patient.  Please do not hesitate to contact me if additional questions or concerns arise.      Total Time spent with patient and coordinating care:  80 minutes         [1]   Allergies  Allergen Reactions    Apple     Cucumber OTHER (SEE COMMENTS)     Hoarseness    Grass     Orange     Plum     Tree, Oak     Watermelon     Shrimp ITCHING

## 2024-12-06 NOTE — IMAGING NOTE
Ultrasound Findings:  Single IUP in cephalic presentation.    Placenta is anterior.   A 3 vessel cord is noted.  Cardiac activity is present at 143 bpm  MVP is 3.5 cm . FABY 11.8 cm  BPP is 8/8.

## 2024-12-06 NOTE — PROGRESS NOTES
Pt is a 29 year old female admitted to 118/118-A.     Chief Complaint   Patient presents with    R/o Preeclampsia     Patient here with c/o blood pressures of 160/100's at home. Patient states she has a headache, but denies any other symptoms at this time. Patient denies any labor symptoms, leaking of fluid, vaginal bleeding or regular contractions. +FM.       Pt is  29w0d intra-uterine pregnancy.  History obtained. Oriented to room, staff, and plan of care.

## 2024-12-06 NOTE — PLAN OF CARE
Problem: ANTEPARTUM/LABOR and DELIVERY  Goal: Maintain pregnancy as long as maternal and/or fetal condition is stable  Description: INTERVENTIONS:  - Maternal surveillance  - Fetal surveillance  - Monitor uterine activity  - Medications as ordered  - Bedrest  Outcome: Progressing  Goal: Anxiety is at manageable level  Description: INTERVENTIONS:  - [place] patient to unit/surroundings  - Establish a trusting relationship with patient  - Discuss possible complications or alterations in birth plan  - Explain treatment plan  - Explain testing/procedures prior to initiation  - Encourage participation in care  - Encourage verbalization of concerns/fears  - Identify coping mechanisms  - Administer/offer alternative therapies (Aroma therapy, distraction, guided imagery, massage, music therapy, therapeutic touch)  - Manage patient's environment (Avoid overstimulation of patient)  Outcome: Progressing  Goal: Demonstrates ability to cope with hospitalization/illness  Description: INTERVENTIONS:  - Encourage verbalization of feelings/concerns/expectations  - Provide quiet environment  - Assist patient to identify own strengths and abilities  - Encourage patient to set small goals for self  - Encourage participation in diversional activity  - Reinforce positive adaptation of new coping behaviors  - Include patient/family/caregiver in decisions  Outcome: Progressing

## 2024-12-06 NOTE — H&P
[facility]  History & Physical    Aury Abarca Patient Status:  Observation    1995 MRN [id number]   Location [facility] LABOR & DELIVERY Attending Rubi Boogie MD   Hosp Day # 0 PCP No primary care provider on file.     Date of Admission:  2024    SUBJECTIVE:    Aury Abarca  is a 29 year old  at 29w1d with Estimated Date of Delivery: 25 presenting at 29w1d for observation 2/2 high bp at home. Denies contractions, vaginal bleeding, and leaking of fluid.  Feeling normal fetal movements.      Her current obstetrical history is significant for IVF pregnancy, obesity, GDMA1, chronic htn. She Is currently on labetalol 400 bid. Reports that BP gets high in the middle of the day.       Obstetric History:   OB History    Para Term  AB Living   2 0 0 0 1 0   SAB IAB Ectopic Multiple Live Births   0 0 1 0 0      # Outcome Date GA Lbr Greg/2nd Weight Sex Type Anes PTL Lv   2 Current            1 Ectopic 2023             Past Medical History:   Past Medical History:    Asthma    Exersice Induced Asthma    ASTHMA    Asthma (HCC)    Essential hypertension    Infertility, female    Polycystic ovary syndrome    - per (Dr. Cuevas)     Past Social History:   Past Surgical History:   Procedure Laterality Date    Salpingectomy Right 2023     Family History:   Family History   Problem Relation Age of Onset    Lipids Father     Diabetes Father     Heart Disorder Mother     Lipids Mother     Hypertension Mother     Hypertension Maternal Grandfather     Heart Disorder Maternal Grandfather     Lipids Maternal Grandfather     Other (Other) Maternal Grandfather         Kidney Disease    Hypertension Maternal Grandmother     Diabetes Paternal Grandmother     Heart Disorder Paternal Grandmother     Hypertension Paternal Grandmother     Lipids Paternal Grandmother     Heart Disease Paternal Grandmother         stroke    High Blood Pressure  Paternal Grandmother     Heart Disorder Paternal Grandfather     Hypertension Paternal Grandfather     Lipids Paternal Grandfather     High Blood Pressure Paternal Grandfather     High Cholesterol Paternal Grandfather      Social History:   Social History     Tobacco Use    Smoking status: Never    Smokeless tobacco: Never   Substance Use Topics    Alcohol use: Not Currently     Comment: socially        Home Meds: Prescriptions Prior to Admission[1]  Allergies: Allergies[2]    OBJECTIVE:    Temp:  [97.9 °F (36.6 °C)-98.2 °F (36.8 °C)] 97.9 °F (36.6 °C)  Pulse:  [80-86] 80  Resp:  [16-18] 18  BP: (125-141)/(68-89) 137/84    Lungs:   Normal effort, no respiratory distress   Heart:   Regular rate   Abdomen: Fundus soft            NSTfrom last night- 120s/ mod keith/ + accels/ no decels  [place] quiet    ASSESSMENT/PLAN:  Aury Abarca is a 29 year old  at 29w1d admitted for observation of bp.    PreE labs normal on admission  Continue labetalol 400 BID  General diet  NST BID  MFM to see today    Rubi Boogie MD  2024  7:01 AM         [1]   Medications Prior to Admission   Medication Sig Dispense Refill Last Dose/Taking    aspirin 81 MG Oral Chew Tab Chew by mouth daily.   2024    prenatal vitamin with DHA 27-0.8-228 MG Oral Cap Take 1 capsule by mouth daily.   2024    Labetalol HCl 300 MG Oral Tab Take 400 mg by mouth 2 (two) times daily.   2024    Labetalol HCl 300 MG Oral Tab Take 400 mg by mouth 2 (two) times daily.   2024    diphenhydrAMINE HCl 25 MG Oral Tab Take 1 tablet (25 mg total) by mouth every 6 (six) hours as needed for Itching.   Past Month    Albuterol Sulfate HFA (PROAIR HFA) 108 (90 Base) MCG/ACT Inhalation Aero Soln 1-2 p q 4-6 hrs prn 1 Inhaler 1 Past Month    Albuterol Sulfate HFA (PROAIR HFA) 108 (90 Base) MCG/ACT Inhalation Aero Soln INHALE 2 PUFFS BY MOUTH INTO THE LUNGS EVERY 6 HOURS AS NEEDED FOR WHEEZING 8.5 g 1 Past Month    amLODIPine 2.5  MG Oral Tab Take 1 tablet (2.5 mg total) by mouth daily. (Patient not taking: Reported on 12/5/2024) 90 tablet 3 Not Taking    omeprazole 20 MG Oral Capsule Delayed Release Take 1 capsule (20 mg total) by mouth daily. Before meal (Patient not taking: Reported on 4/2/2023) 30 capsule 11     Fluticasone Propionate 50 MCG/ACT Nasal Suspension 1 spray by Nasal route daily. (Patient not taking: Reported on 12/5/2024)   Not Taking    EPINEPHrine (AUVI-Q) 0.3 MG/0.3ML Injection Solution Auto-injector Inject 0.3 mL (1 each total) as directed as needed. For anaphylaxis and call 911, [phone] 1 each 3 More than a month    Clobetasol Propionate 0.05 % External Cream Apply 1 Application topically 2 (two) times daily. 3 each 1    [2]   Allergies  Allergen Reactions    Apple     Cucumber OTHER (SEE COMMENTS)     Hoarseness    Grass     Orange     Plum     Tree, Oak     Watermelon     Shrimp ITCHING

## 2024-12-06 NOTE — PLAN OF CARE
Problem: ANTEPARTUM/LABOR and DELIVERY  Goal: Maintain pregnancy as long as maternal and/or fetal condition is stable  Description: INTERVENTIONS:  - Maternal surveillance  - Fetal surveillance  - Monitor uterine activity  - Medications as ordered  - Bedrest  12/6/2024 0938 by Claudia Owens RN  Outcome: Progressing  12/6/2024 0937 by Claudia Owens RN  Outcome: Progressing  Goal: Anxiety is at manageable level  Description: INTERVENTIONS:  - [place] patient to unit/surroundings  - Establish a trusting relationship with patient  - Discuss possible complications or alterations in birth plan  - Explain treatment plan  - Explain testing/procedures prior to initiation  - Encourage participation in care  - Encourage verbalization of concerns/fears  - Identify coping mechanisms  - Administer/offer alternative therapies (Aroma therapy, distraction, guided imagery, massage, music therapy, therapeutic touch)  - Manage patient's environment (Avoid overstimulation of patient)  12/6/2024 0938 by Claudia Owens RN  Outcome: Progressing  12/6/2024 0937 by Claudia Owens RN  Outcome: Progressing  Goal: Demonstrates ability to cope with hospitalization/illness  Description: INTERVENTIONS:  - Encourage verbalization of feelings/concerns/expectations  - Provide quiet environment  - Assist patient to identify own strengths and abilities  - Encourage patient to set small goals for self  - Encourage participation in diversional activity  - Reinforce positive adaptation of new coping behaviors  - Include patient/family/caregiver in decisions  12/6/2024 0938 by Claudia Owens RN  Outcome: Progressing  12/6/2024 0937 by Claudia Owens RN  Outcome: Progressing     Problem: Patient/Family Goals  Goal: Patient/Family Long Term Goal  Description: Patient's Long Term Goal: safe, term delivery    Interventions:  - communicate POC  - See additional Care Plan goals for specific interventions  12/6/2024 0938 by Claudia Owens  RN  Outcome: Progressing  12/6/2024 0937 by Claudia Owens RN  Outcome: Progressing  Goal: Patient/Family Short Term Goal  Description: Patient's Short Term Goal: BP Control    Interventions:   - communicate POC  - See additional Care Plan goals for specific interventions  12/6/2024 0938 by Claudia Owens RN  Outcome: Progressing  12/6/2024 0937 by Claudia Owens RN  Outcome: Progressing

## 2024-12-07 NOTE — PROGRESS NOTES
Discussed with Dr. Olguin who approved discharge home. Because 24hr urine under 300mg, no diagnosis of pre-eclampsia at this time.   RTO early to mid next week.

## 2024-12-07 NOTE — PROGRESS NOTES
0608- Hospital Blood pressure machine reading 125/73  Patient Blood pressure machine reading 124/76

## 2024-12-07 NOTE — PLAN OF CARE
Problem: ANTEPARTUM/LABOR and DELIVERY  Goal: Maintain pregnancy as long as maternal and/or fetal condition is stable  Description: INTERVENTIONS:  - Maternal surveillance  - Fetal surveillance  - Monitor uterine activity  - Medications as ordered  - Bedrest  Outcome: Progressing  Goal: Anxiety is at manageable level  Description: INTERVENTIONS:  - [place] patient to unit/surroundings  - Establish a trusting relationship with patient  - Discuss possible complications or alterations in birth plan  - Explain treatment plan  - Explain testing/procedures prior to initiation  - Encourage participation in care  - Encourage verbalization of concerns/fears  - Identify coping mechanisms  - Administer/offer alternative therapies (Aroma therapy, distraction, guided imagery, massage, music therapy, therapeutic touch)  - Manage patient's environment (Avoid overstimulation of patient)  Outcome: Progressing  Goal: Demonstrates ability to cope with hospitalization/illness  Description: INTERVENTIONS:  - Encourage verbalization of feelings/concerns/expectations  - Provide quiet environment  - Assist patient to identify own strengths and abilities  - Encourage patient to set small goals for self  - Encourage participation in diversional activity  - Reinforce positive adaptation of new coping behaviors  - Include patient/family/caregiver in decisions  Outcome: Progressing     Problem: Patient/Family Goals  Goal: Patient/Family Long Term Goal  Description: Patient's Long Term Goal: safe, term delivery    Interventions:  - communicate POC  - See additional Care Plan goals for specific interventions  Outcome: Progressing  Goal: Patient/Family Short Term Goal  Description: Patient's Short Term Goal: BP Control    Interventions:   - communicate POC  - See additional Care Plan goals for specific interventions  Outcome: Progressing

## 2024-12-07 NOTE — PLAN OF CARE
Problem: ANTEPARTUM/LABOR and DELIVERY  Goal: Maintain pregnancy as long as maternal and/or fetal condition is stable  Description: INTERVENTIONS:  - Maternal surveillance  - Fetal surveillance  - Monitor uterine activity  - Medications as ordered  - Bedrest  Outcome: Progressing  Goal: Anxiety is at manageable level  Description: INTERVENTIONS:  - [place] patient to unit/surroundings  - Establish a trusting relationship with patient  - Discuss possible complications or alterations in birth plan  - Explain treatment plan  - Explain testing/procedures prior to initiation  - Encourage participation in care  - Encourage verbalization of concerns/fears  - Identify coping mechanisms  - Administer/offer alternative therapies (Aroma therapy, distraction, guided imagery, massage, music therapy, therapeutic touch)  - Manage patient's environment (Avoid overstimulation of patient)  Outcome: Progressing  Goal: Demonstrates ability to cope with hospitalization/illness  Description: INTERVENTIONS:  - Encourage verbalization of feelings/concerns/expectations  - Provide quiet environment  - Assist patient to identify own strengths and abilities  - Encourage patient to set small goals for self  - Encourage participation in diversional activity  - Reinforce positive adaptation of new coping behaviors  - Include patient/family/caregiver in decisions  Outcome: Progressing     Problem: Patient/Family Goals  Goal: Patient/Family Long Term Goal  Description: Patient's Long Term Goal: safe, term delivery    Interventions:  - communicate POC  - See additional Care Plan goals for specific interventions  Outcome: Progressing  Goal: Patient/Family Short Term Goal  Description: Patient's Short Term Goal: BP Control    Interventions:   - communicate POC  - See additional Care Plan goals for specific interventions  Outcome: Progressing

## 2024-12-07 NOTE — PROGRESS NOTES
1630 Hospital Blood pressure machine reading 136/70. Patient blood pressure machine reading 116/85.    1815 Hospital Blood pressure machine reading 142/91. Patient blood pressure machine reading 135/102.    Notified Dr. Kohler

## 2024-12-07 NOTE — PROGRESS NOTES
SUBJECTIVE:   pt without complaints.  No HA, no vis changes, no RUQ pain  OBJECTIVE:  Temp:  [97.4 °F (36.3 °C)-98.1 °F (36.7 °C)] 97.8 °F (36.6 °C)  Pulse:  [9-91] 9  Resp:  [16-17] 17  BP: (118-145)/(63-91) 132/87  SpO2:  [96 %-98 %] 98 %  Abdomen- soft gravid NT  Extremities- no edema NT Bilateral lower extremities    Fetal Surveillance:  FHT-140, mod keith, +accells, no decells  TOCO- none    Cervix: deferred    Labs-  Recent Labs   Lab 24  1509 24  2147   RBC 3.94 3.68*   HGB 11.9* 11.2*   HCT 34.7* 31.9*   MCV 88.1 86.7   MCH 30.2 30.4   MCHC 34.3 35.1   RDW 11.4 11.4   NEPRELIM 7.11 7.63   WBC 10.0 10.9   .0 292.0         Recent Labs   Lab 24  1509 24  2147   GLU 80 111*   BUN 8* 9   CREATSERUM 0.56 0.55   EGFRCR 127 127   CA 10.0 9.9   ALB 3.9 3.7   * 135*   K 4.0 3.9    103   CO2 23.0 24.0   ALKPHO 83 81   AST 22 21   ALT 18 18   BILT 0.2* 0.2*   TP 7.5 6.8     24 hr urine- 270  Urine PCR-0.22      ASSESSMENT/PLAN:    1.29 year old y/o, [id number] 29w2d  2. FWB-reassuring  3. Prematurity- steroids none given  4. Mild pre-eclampsia vs chronic hypertension- 24 hr urine 270mg. No definitive diagnosis of pre-eclampsia. However, 24hr urine is close to 300mg. Await MFM input. BP's currently controlled with labetalol 400mg BID

## 2024-12-07 NOTE — PLAN OF CARE
Problem: ANTEPARTUM/LABOR and DELIVERY  Goal: Maintain pregnancy as long as maternal and/or fetal condition is stable  Description: INTERVENTIONS:  - Maternal surveillance  - Fetal surveillance  - Monitor uterine activity  - Medications as ordered  - Bedrest  12/7/2024 1102 by Jia Brian RN  Outcome: Completed  12/7/2024 0855 by Jia Brian RN  Outcome: Progressing  Goal: Anxiety is at manageable level  Description: INTERVENTIONS:  - [place] patient to unit/surroundings  - Establish a trusting relationship with patient  - Discuss possible complications or alterations in birth plan  - Explain treatment plan  - Explain testing/procedures prior to initiation  - Encourage participation in care  - Encourage verbalization of concerns/fears  - Identify coping mechanisms  - Administer/offer alternative therapies (Aroma therapy, distraction, guided imagery, massage, music therapy, therapeutic touch)  - Manage patient's environment (Avoid overstimulation of patient)  12/7/2024 1102 by Jia Brian, RN  Outcome: Completed  12/7/2024 0855 by Jia Brian RN  Outcome: Progressing  Goal: Demonstrates ability to cope with hospitalization/illness  Description: INTERVENTIONS:  - Encourage verbalization of feelings/concerns/expectations  - Provide quiet environment  - Assist patient to identify own strengths and abilities  - Encourage patient to set small goals for self  - Encourage participation in diversional activity  - Reinforce positive adaptation of new coping behaviors  - Include patient/family/caregiver in decisions  12/7/2024 1102 by Jia Brian RN  Outcome: Completed  12/7/2024 0855 by Jia Brian, RN  Outcome: Progressing     Problem: Patient/Family Goals  Goal: Patient/Family Long Term Goal  Description: Patient's Long Term Goal: safe, term delivery    Interventions:  - communicate POC  - See additional Care Plan goals for specific interventions  12/7/2024 1102 by Jia Brian, RN  Outcome: Completed  12/7/2024  0855 by Jia Brian, RN  Outcome: Progressing  Goal: Patient/Family Short Term Goal  Description: Patient's Short Term Goal: BP Control    Interventions:   - communicate POC  - See additional Care Plan goals for specific interventions  12/7/2024 1102 by Jia Brian, RN  Outcome: Completed  12/7/2024 0855 by Jia Brian, RN  Outcome: Progressing

## 2024-12-29 NOTE — PROGRESS NOTES
Pt still reports constant low level right lower abd/groin discomfort, however no change or progression of pain noted. Rare ctxs by toco, abd soft & NT to palpation, SVE closed/thick/high. FHR reactive. Pt has felt multiple episodes of FM since arrival. BP on arrival 143/93, currently 141/93. Took own labetalol ~45mins ago. No HA, epigastric pain, or vision changes at present. DTR's 2+, no clonus; urine dip negative for protein. Plan of care discussed w/ MD & relayed to pt who expresses understanding & agreement. Discharge instructions given verbally & written, questions answered. Pt discharged ambulatory in stable condition accompanied by .

## 2024-12-29 NOTE — PROGRESS NOTES
Pt is a 29 year old female admitted to TRG4/TRG4-A.     Chief Complaint   Patient presents with    Decreased Fetal Movement     Less FM than normal all day, none that pt was aware of since 2pm; cramping since 1915, right abdomen down into groin, feels constant. Denies fluid leaking or bleeding. Pt has GDM, diet controlled. Also taking labetalol for CHTN. Denies other complications w/ pregnancy.      Pt is  32w2d intra-uterine pregnancy.  History obtained. Oriented to room, staff, and plan of care.    Pt given NST marker to record movements felt.    Pt also notes having rec'd covid & RSV vaccines this morning around 11am.    Initial BP elevated; pt has not taken PM dose of labetalol yet.

## 2024-12-29 NOTE — DISCHARGE INSTRUCTIONS
Labor Discharge Instructions    Call your OB doctor if you have any of the following signs:    Period-like cramps that may come and go  Low, dull backache  Pressure in your lower abdomen that may feel like the baby is pushing down  Change in the type or amount of vaginal discharge  Abdominal cramps that may be accompanied by diarrhea  Fluid leaking from your vagina (may or may not be bloody)    Discharge Instructions    Diet: Regular; adequate fluids  Activity: Normal activity         General Instructions    Call your OB doctor if: Decrease in fetal movement;Fluid leaking from your vagina;Vaginal or rectal pressure;Uterine contractions 10 minutes or closer for 1 to 2 hours;Vaginal bleeding;Uterine contractions increasing in intensity and frequency;Temperature greater than 100F

## 2024-12-29 NOTE — NST
Nonstress Test   Patient: Aury Abarca    Gestation: 32w2d    NST:       Variability: Moderate           Accelerations: Yes           Decelerations: None            Baseline: 135 BPM           Uterine Irritability: No           Contractions: Irregular                    Contraction Frequency: rare                   Acoustic Stimulator: No           Nonstress Test Interpretation: Reactive           Nonstress Test Second Interpretation: Reactive                     Additional Comments      Physician Evaluation      NST Interpretation: Reactive    Disposition:   Discharged    Comments:        Tamika Luna MD

## 2025-01-06 NOTE — H&P
[facility]  History & Physical    Aury Abarca Patient Status:  Observation    1995 MRN [id number]   Location [facility] LABOR & DELIVERY Attending Rubi Boogie MD   Hosp Day # 0 PCP No primary care provider on file.     Date of Admission:  25    SUBJECTIVE:    Aury Abarca  is a 29 year old  at 29w1d with Estimated Date of Delivery: 25 presenting at 33+4wga here for high bp at home and headache/ Pt reports headache in the front of forehead near her eye and high bp at home 150/114. Pregnancy complicated by IVF, obesity, GDMA1 (likely T2DM) and chronic htn currently on labetalol 600mg BID. She takes her 2nd dose early as bp gets higher closer to when due for her 2nd dose. Baby is moving. Having some mild cramping/pressure.     Obstetric History:   OB History    Para Term  AB Living   2 0 0 0 1 0   SAB IAB Ectopic Multiple Live Births   0 0 1 0 0      # Outcome Date GA Lbr Greg/2nd Weight Sex Type Anes PTL Lv   2 Current            1 Ectopic 2023             Past Medical History:   Past Medical History:    Asthma    Exersice Induced Asthma    ASTHMA    Asthma (HCC)    Essential hypertension    Gestational diabetes (HCC)    diet controlled    Infertility, female    Polycystic ovary syndrome    - per (Dr. Cuevas)     Past Social History:   Past Surgical History:   Procedure Laterality Date    Hip arthro w/labral repair Left 2020    Ivf package      Salpingectomy Right 2023    ectopic pregnancy     Family History:   Family History   Problem Relation Age of Onset    Lipids Father     Diabetes Father     Heart Disorder Mother     Lipids Mother     Hypertension Mother     Hypertension Maternal Grandfather     Heart Disorder Maternal Grandfather     Lipids Maternal Grandfather     Other (Other) Maternal Grandfather         Kidney Disease    Hypertension Maternal Grandmother     Diabetes Paternal Grandmother     Heart  Disorder Paternal Grandmother     Hypertension Paternal Grandmother     Lipids Paternal Grandmother     Heart Disease Paternal Grandmother         stroke    High Blood Pressure Paternal Grandmother     Heart Disorder Paternal Grandfather     Hypertension Paternal Grandfather     Lipids Paternal Grandfather     High Blood Pressure Paternal Grandfather     High Cholesterol Paternal Grandfather      Social History:   Social History     Tobacco Use    Smoking status: Never    Smokeless tobacco: Never   Substance Use Topics    Alcohol use: Not Currently     Comment: socially        Home Meds: Prescriptions Prior to Admission[1]  Allergies: Allergies[2]    OBJECTIVE:    Temp:  [98.1 °F (36.7 °C)] 98.1 °F (36.7 °C)  Pulse:  [71-86] 71  Resp:  [17] 17  BP: (121-136)/(74-88) 136/79    Lungs:   Normal effort, no respiratory distress   Heart:   Regular rate   Abdomen: Fundus soft            FHT 130s/ mod keith/ + accels/ no decels  [place] quiet  Cervix closed/thick    ASSESSMENT/PLAN:  Aury Abarca is a 29 year old  at 33+4 wga admitted for headache, elevated bp at home.    CHTN  - elevated BP at home, normal bp here  --continue labetalol 600 bid  -mild proteinuria on labs, will start 24 hour urine  -current plan is for delivery at 37 weeks  -bp q 4 hours    Headache  -received tylenol, will give benadryl/reglan    GDMA1  -diet controlled, GDM diet    FWB  NST q shift    Rubi Boogie MD  2024  7:01 AM       [1]   Medications Prior to Admission   Medication Sig Dispense Refill Last Dose/Taking    aspirin 81 MG Oral Chew Tab Chew 1 tablet (81 mg total) by mouth in the morning and 1 tablet (81 mg total) before bedtime.   2025    labetalol 200 MG Oral Tab Take 2 tablets (400 mg total) by mouth 2 (two) times daily. (Patient taking differently: Take 3 tablets (600 mg total) by mouth 2 (two) times daily.) 60 tablet 3     prenatal vitamin with DHA 27-0.8-228 MG Oral Cap Take 1 capsule by mouth  daily.       EPINEPHrine (AUVI-Q) 0.3 MG/0.3ML Injection Solution Auto-injector Inject 0.3 mL (1 each total) as directed as needed. For anaphylaxis and call 911, [phone] 1 each 3     Albuterol Sulfate HFA (PROAIR HFA) 108 (90 Base) MCG/ACT Inhalation Aero Soln INHALE 2 PUFFS BY MOUTH INTO THE LUNGS EVERY 6 HOURS AS NEEDED FOR WHEEZING 8.5 g 1 More than a month   [2]   Allergies  Allergen Reactions    Cucumber OTHER (SEE COMMENTS)     Hoarseness    Grass     Plum     Tree, Oak     Watermelon     Shrimp ITCHING

## 2025-01-06 NOTE — PROGRESS NOTES
Pt is a 29 year old female admitted to TRG5/TRG5-A.     Chief Complaint   Patient presents with    R/o Preeclampsia      Pt is  33w4d intra-uterine pregnancy.  History obtained, consents signed. Oriented to room, staff, and plan of care.    Pt states that B/P today has been 150s/110s with headache /10.  Pt is known CHTN on labetalol with increase to 600mg BID last week.   Pt reports last dose was at 1030 and was concerned that it \"is taking longer and longer for my blood pressure to respond to medication. I tried calling my doctor and received no response so I decided to come in.\"  Pt here with mother who states she is a nurse and is concerned about blood pressure, stroke, and her own history of PIH.   All questions and concerns addressed, opportunity to ask questions provided.   Generalized non pitting edema noted to BLE, Reflexes 2+ bilterally, no clonus noted.   Pt reports +FM.

## 2025-01-07 NOTE — PLAN OF CARE
Problem: Patient/Family Goals  Goal: Patient/Family Long Term Goal  Description: Patient's Long Term Goal: maintain BP WNL.     Interventions:  - See additional Care Plan goals for specific interventions  1/7/2025 1403 by Jia Brian RN  Outcome: Progressing  1/7/2025 0752 by Jia Brian RN  Outcome: Progressing  1/7/2025 0752 by Jia Brian RN  Outcome: Progressing  Goal: Patient/Family Short Term Goal  Description: Patient's Short Term Goal: endorse no signs or symptoms of pre-eclampsia.     Interventions:   - See additional Care Plan goals for specific interventions  1/7/2025 1403 by Jia Brian RN  Outcome: Progressing  1/7/2025 0752 by Jia Brian RN  Outcome: Progressing  1/7/2025 0752 by Jia Brian RN  Outcome: Progressing     Problem: ANTEPARTUM/LABOR and DELIVERY  Goal: Maintain pregnancy as long as maternal and/or fetal condition is stable  Description: INTERVENTIONS:  - Maternal surveillance  - Fetal surveillance  - Monitor uterine activity  - Medications as ordered  - Bedrest  1/7/2025 1403 by Jia Brian RN  Outcome: Progressing  1/7/2025 0752 by Jia Brian RN  Outcome: Progressing  1/7/2025 0752 by Jia Brian RN  Outcome: Progressing  Goal: Anxiety is at manageable level  Description: INTERVENTIONS:  - [place] patient to unit/surroundings  - Establish a trusting relationship with patient  - Discuss possible complications or alterations in birth plan  - Explain treatment plan  - Explain testing/procedures prior to initiation  - Encourage participation in care  - Encourage verbalization of concerns/fears  - Identify coping mechanisms  - Administer/offer alternative therapies (Aroma therapy, distraction, guided imagery, massage, music therapy, therapeutic touch)  - Manage patient's environment (Avoid overstimulation of patient)  1/7/2025 1403 by Jia Brian RN  Outcome: Progressing  1/7/2025 0752 by Jia Brian RN  Outcome: Progressing  1/7/2025 0752 by Jia Brian RN  Outcome:  Progressing  Goal: Demonstrates ability to cope with hospitalization/illness  Description: INTERVENTIONS:  - Encourage verbalization of feelings/concerns/expectations  - Provide quiet environment  - Assist patient to identify own strengths and abilities  - Encourage patient to set small goals for self  - Encourage participation in diversional activity  - Reinforce positive adaptation of new coping behaviors  - Include patient/family/caregiver in decisions  1/7/2025 1403 by Jia Brian, RN  Outcome: Progressing  1/7/2025 0752 by Jia Brian, RN  Outcome: Progressing  1/7/2025 0752 by Jia Brian, RN  Outcome: Progressing

## 2025-01-07 NOTE — PROGRESS NOTES
Antepartum Progress Note  Doing well this morning.  Headache resolved.  Denies vision changes or RUQ pain.  Some pelvic cramping intermittently, not severe.  Denies vaginal bleeding or leaking of fluid.  Does endorse slight decrease in fetal movements this morning.     Temp:  [97.5 °F (36.4 °C)-98.1 °F (36.7 °C)] 97.6 °F (36.4 °C)  Pulse:  [71-95] 92  Resp:  [14-19] 16  BP: (118-150)/(74-92) 131/84    General: no acute distress  Abdomen: gravid uterus, non-tender  Extremities: non-tender, no edema    NST interpretation: reactive   FHT:  baseline 135bpm, mod variability, accels appreciated, no decels  [place]:  contractions - none tracing on toco     Recent Labs   Lab 25  1524   RBC 4.13   HGB 11.7*   HCT 35.2   MCV 85.2   MCH 28.3   MCHC 33.2   RDW 11.7   NEPRELIM 7.61   WBC 10.4   .0       ASSESSMENT/PLAN:  Aury Abarca is a 29 year old  at 33w5d admitted with cHTN with concern for superimposed pre-eclampsia without severe features.    CBC, CMP wnl  UPC with proteinuria; 24hr urine protein pending  HA resolved - no other features of severe preE  Continues home labetalol 600mg BID - has not needed increase from home dose    Complete 24hr urine, continue BP monitoring.  If BP continues to be well controlled, anticipate discharge home this evening.    Millie Contreras DO     Hospital stay: 0

## 2025-01-07 NOTE — PLAN OF CARE
Problem: Patient/Family Goals  Goal: Patient/Family Long Term Goal  Description: Patient's Long Term Goal: maintain BP WNL.     Interventions:  - See additional Care Plan goals for specific interventions  1/7/2025 0752 by Jia Brian RN  Outcome: Progressing  1/7/2025 0752 by Jia Brian RN  Outcome: Progressing  Goal: Patient/Family Short Term Goal  Description: Patient's Short Term Goal: endorse no signs or symptoms of pre-eclampsia.     Interventions:   - See additional Care Plan goals for specific interventions  1/7/2025 0752 by Jia Brian RN  Outcome: Progressing  1/7/2025 0752 by Jia Brian RN  Outcome: Progressing     Problem: ANTEPARTUM/LABOR and DELIVERY  Goal: Maintain pregnancy as long as maternal and/or fetal condition is stable  Description: INTERVENTIONS:  - Maternal surveillance  - Fetal surveillance  - Monitor uterine activity  - Medications as ordered  - Bedrest  1/7/2025 0752 by Jia Brian RN  Outcome: Progressing  1/7/2025 0752 by Jia Brian RN  Outcome: Progressing  Goal: Anxiety is at manageable level  Description: INTERVENTIONS:  - [place] patient to unit/surroundings  - Establish a trusting relationship with patient  - Discuss possible complications or alterations in birth plan  - Explain treatment plan  - Explain testing/procedures prior to initiation  - Encourage participation in care  - Encourage verbalization of concerns/fears  - Identify coping mechanisms  - Administer/offer alternative therapies (Aroma therapy, distraction, guided imagery, massage, music therapy, therapeutic touch)  - Manage patient's environment (Avoid overstimulation of patient)  1/7/2025 0752 by Jia Brian RN  Outcome: Progressing  1/7/2025 0752 by Jia Brian RN  Outcome: Progressing  Goal: Demonstrates ability to cope with hospitalization/illness  Description: INTERVENTIONS:  - Encourage verbalization of feelings/concerns/expectations  - Provide quiet environment  - Assist patient to identify  own strengths and abilities  - Encourage patient to set small goals for self  - Encourage participation in diversional activity  - Reinforce positive adaptation of new coping behaviors  - Include patient/family/caregiver in decisions  1/7/2025 0752 by Jia Brian, RN  Outcome: Progressing  1/7/2025 0752 by Jia Brian, RN  Outcome: Progressing

## 2025-01-07 NOTE — PLAN OF CARE
Problem: Patient/Family Goals  Goal: Patient/Family Long Term Goal  Description: Patient's Long Term Goal: maintain BP WNL.     Interventions:  - See additional Care Plan goals for specific interventions  Outcome: Progressing  Goal: Patient/Family Short Term Goal  Description: Patient's Short Term Goal: endorse no signs or symptoms of pre-eclampsia.     Interventions:   - See additional Care Plan goals for specific interventions  Outcome: Progressing     Problem: ANTEPARTUM/LABOR and DELIVERY  Goal: Maintain pregnancy as long as maternal and/or fetal condition is stable  Description: INTERVENTIONS:  - Maternal surveillance  - Fetal surveillance  - Monitor uterine activity  - Medications as ordered  - Bedrest  Outcome: Progressing  Goal: Anxiety is at manageable level  Description: INTERVENTIONS:  - [place] patient to unit/surroundings  - Establish a trusting relationship with patient  - Discuss possible complications or alterations in birth plan  - Explain treatment plan  - Explain testing/procedures prior to initiation  - Encourage participation in care  - Encourage verbalization of concerns/fears  - Identify coping mechanisms  - Administer/offer alternative therapies (Aroma therapy, distraction, guided imagery, massage, music therapy, therapeutic touch)  - Manage patient's environment (Avoid overstimulation of patient)  Outcome: Progressing  Goal: Demonstrates ability to cope with hospitalization/illness  Description: INTERVENTIONS:  - Encourage verbalization of feelings/concerns/expectations  - Provide quiet environment  - Assist patient to identify own strengths and abilities  - Encourage patient to set small goals for self  - Encourage participation in diversional activity  - Reinforce positive adaptation of new coping behaviors  - Include patient/family/caregiver in decisions  Outcome: Progressing

## 2025-01-07 NOTE — PROGRESS NOTES
Bedside report endorsed to TANESHA PRATT. Patient finishing eating supper, family visiting at this time. Patient stable condition, reports HA pain decreased from 8/10 to 6/10. Patient declined saline lock for IV benadryl/reglan because she states she \"wants to see how she feels after she eats.\"

## 2025-01-07 NOTE — PROGRESS NOTES
Report received from Kiersten WILLIS RN. Pt sitting up at side of bed eating dinner. Pt in stable condition. Family at bedside. Call light within reach.

## 2025-01-08 NOTE — PROGRESS NOTES
24hr protein normal.  BP remains at goal on home labetalol 600mg BID without dose adjustment.  No symptoms of pre-eclampsia.  Discussed with MFM, would not considering this superimposed preE at this time.  Deferring BMZ as such and to prevent worsening diabetes control.     NST reactive and reassuring, FHR baseline 135bpm.      Discussed discharge instructions with patient including home BP monitoring. Discussed when to call or reach out. To keep appt in office tomorrow for another BP check.      Plan for discharge home.

## 2025-01-08 NOTE — PLAN OF CARE
Problem: Patient/Family Goals  Goal: Patient/Family Long Term Goal  Description: Patient's Long Term Goal: maintain BP WNL.     Interventions:  - See additional Care Plan goals for specific interventions  1/7/2025 1810 by Jia Brian RN  Outcome: Completed  1/7/2025 1403 by Jia Brian RN  Outcome: Progressing  1/7/2025 0752 by Jia Brian RN  Outcome: Progressing  1/7/2025 0752 by Jia Brian RN  Outcome: Progressing  Goal: Patient/Family Short Term Goal  Description: Patient's Short Term Goal: endorse no signs or symptoms of pre-eclampsia.     Interventions:   - See additional Care Plan goals for specific interventions  1/7/2025 1810 by Jia Brian RN  Outcome: Completed  1/7/2025 1403 by Jia Brian RN  Outcome: Progressing  1/7/2025 0752 by Jia Brian RN  Outcome: Progressing  1/7/2025 0752 by Jia Brian RN  Outcome: Progressing     Problem: ANTEPARTUM/LABOR and DELIVERY  Goal: Maintain pregnancy as long as maternal and/or fetal condition is stable  Description: INTERVENTIONS:  - Maternal surveillance  - Fetal surveillance  - Monitor uterine activity  - Medications as ordered  - Bedrest  1/7/2025 1810 by Jia Brian RN  Outcome: Completed  1/7/2025 1403 by Jia Brian RN  Outcome: Progressing  1/7/2025 0752 by Jia Brian RN  Outcome: Progressing  1/7/2025 0752 by Jia Brian RN  Outcome: Progressing  Goal: Anxiety is at manageable level  Description: INTERVENTIONS:  - [place] patient to unit/surroundings  - Establish a trusting relationship with patient  - Discuss possible complications or alterations in birth plan  - Explain treatment plan  - Explain testing/procedures prior to initiation  - Encourage participation in care  - Encourage verbalization of concerns/fears  - Identify coping mechanisms  - Administer/offer alternative therapies (Aroma therapy, distraction, guided imagery, massage, music therapy, therapeutic touch)  - Manage patient's environment (Avoid overstimulation of  patient)  1/7/2025 1810 by Jia Brian RN  Outcome: Completed  1/7/2025 1403 by Jia Brian RN  Outcome: Progressing  1/7/2025 0752 by Jia Brian RN  Outcome: Progressing  1/7/2025 0752 by Jia Brian RN  Outcome: Progressing  Goal: Demonstrates ability to cope with hospitalization/illness  Description: INTERVENTIONS:  - Encourage verbalization of feelings/concerns/expectations  - Provide quiet environment  - Assist patient to identify own strengths and abilities  - Encourage patient to set small goals for self  - Encourage participation in diversional activity  - Reinforce positive adaptation of new coping behaviors  - Include patient/family/caregiver in decisions  1/7/2025 1810 by Jia Brian RN  Outcome: Completed  1/7/2025 1403 by Jia Brian RN  Outcome: Progressing  1/7/2025 0752 by Jia Brian RN  Outcome: Progressing  1/7/2025 0752 by Jia Brian RN  Outcome: Progressing     Problem: ANTEPARTUM/LABOR and DELIVERY  Goal: Anxiety is at manageable level  Description: INTERVENTIONS:  - [place] patient to unit/surroundings  - Establish a trusting relationship with patient  - Discuss possible complications or alterations in birth plan  - Explain treatment plan  - Explain testing/procedures prior to initiation  - Encourage participation in care  - Encourage verbalization of concerns/fears  - Identify coping mechanisms  - Administer/offer alternative therapies (Aroma therapy, distraction, guided imagery, massage, music therapy, therapeutic touch)  - Manage patient's environment (Avoid overstimulation of patient)  1/7/2025 1810 by Jia Brian RN  Outcome: Completed  1/7/2025 1403 by Jia Brian RN  Outcome: Progressing  1/7/2025 0752 by Jia Brian RN  Outcome: Progressing  1/7/2025 0752 by Jia Brian RN  Outcome: Progressing     Problem: ANTEPARTUM/LABOR and DELIVERY  Goal: Demonstrates ability to cope with hospitalization/illness  Description: INTERVENTIONS:  - Encourage verbalization of  feelings/concerns/expectations  - Provide quiet environment  - Assist patient to identify own strengths and abilities  - Encourage patient to set small goals for self  - Encourage participation in diversional activity  - Reinforce positive adaptation of new coping behaviors  - Include patient/family/caregiver in decisions  1/7/2025 1810 by Jia Brian, RN  Outcome: Completed  1/7/2025 1403 by Jia Brian, RN  Outcome: Progressing  1/7/2025 0752 by Jia Brian, RN  Outcome: Progressing  1/7/2025 0752 by Jia Brian, RN  Outcome: Progressing

## 2025-01-08 NOTE — DISCHARGE SUMMARY
[facility]  OB/GYN Surgeon Discharge Summary    Aury Abarca Patient Status:  Observation    1995 MRN [id number]   Location [facility] LABOR & DELIVERY Attending Rubi Boogie MD   Hosp Day # 0 PCP No primary care provider on file.       Admit date: 2025    Discharge date : 25    Admitting Physician: Rubi Boogie MD   Discharging Physician: Millie Contreras DO     Reason for admission:   Pregnancy at 33w4d gestation  Chronic hypertension     Procedures:   none    Hospital Course:   Aury Abarca is a 29 year old  who presented to L&D at 33w4d gestation for elevated BP at home and headache.  Chronic hypertension, controlled on labetalol 600mg BID.  She was admitted for BP monitoring and evaluation for pre-eclampsia.  CBC and CMP were normal.  UPC was elevated, as such 24hr urine protein collected.  24hr urine protein was normal.  BP remained at goal without adjustment of home medication.  Headache was resolved.    She was discharged home in stable condition with home BP monitoring instructions reviewed.  She is to continue current labetalol 600mg BID.      Discharge Diagnoses:  Same as above    Discharged Condition: good    Disposition: home    Patient Instructions:   Keep scheduled prenatal appointment 25.  Schedule additional appointment in 1 week for prenatal visit.     Millie Contreras DO  2025  6:20 PM

## 2025-02-01 NOTE — H&P
H&P Exam - Obstetrics   Aury Abarca 29 year old female MRN: [id number]  Unit/Bed#: 106-A Encounter: [id number]    Chief Complaint: scheduled induction of labor    HPI:  Aury Abarca is a 29 year old  female with an KIANA of 2025, by Patient Reported at 37w2d weeks gestation who is being admitted for induction of labor for chronic HTN with superimposed pre-eclampsia without severe features, A1GDM. She denies headaches, chest pain, SOB, visual disturbances, RUQ pain    Contractions: no  Loss of fluid: no  Vaginal bleeding: no  Fetal movement: yes    PREGNANCY COMPLICATIONS:   1) Chronic HTN with superimposed pre-eclampsia without severe feature: labetalol 600mg BID  2) A1GMD  3) IVF pregnancy    OB History    Para Term  AB Living   2 0 0 0 1 0   SAB IAB Ectopic Multiple Live Births   0 0 1 0 0      # Outcome Date GA Lbr Greg/2nd Weight Sex Type Anes PTL Lv   2 Current            1 Ectopic 2023               Baby complications/comments: none    Review of Systems   Constitutional: Negative.    Respiratory: Negative.     Cardiovascular: Negative.    Gastrointestinal: Negative.    Skin: Negative.    Neurological: Negative.        Past Medical History:    Asthma    Exersice Induced Asthma    ASTHMA    Asthma (HCC)    Essential hypertension    Gestational diabetes (HCC)    diet controlled    Infertility, female    Polycystic ovary syndrome     per (Dr. Cuevas)    Scoliosis     Past Surgical History:   Procedure Laterality Date    Hip arthro w/labral repair Left 2020    Ivf package      Salpingectomy Right 2023    ectopic pregnancy     History   Alcohol Use Not Currently     Comment: socially      History   Drug Use No     History   Smoking Status    Never   Smokeless Tobacco    Never       Allergies  Allergies[1]    OBJECTIVE:    Vitals: Blood pressure (!) 146/96, pulse 70, temperature 98.2 °F (36.8 °C), temperature source Oral, resp. rate 18,  weight 174 lb (78.9 kg), not currently breastfeeding.Body mass index is 35.14 kg/m².        Cervix:  Dilation: Fingertip  Effacement (%): 70  Station: -1  Cervical Characteristics: Anterior;Soft    Fetal heart rate:   Baseline Rate: 130 bpm    [place]:   Contraction Frequency (min): irregular  Contraction Duration (sec): 40-60    Placenta: anterior    EFW: EFW 2297 g (5 lb 1 oz) 28% @ 34w    GBS: negative    Prenatal Labs:    Blood type: A+  Antibody: negative  HIV: negative  Hepatitis B: negative  RPR: non-reactive  Rubella: Immune      ASSESSMENT:  30yo  at 37w2d weeks gestation who is being admitted for induction of labor for chronic HTN with superimposed pre-eclampsia without severe features. Initial BP severely elevated but patient had not taken her PM labetalol. BP elevated but not severe after receiving PO meds.    PLAN:   - Admit   - CBC, RPR, Blood Type   - Cytotec per protocol   - GBS negative status: no PCN for prophylaxis    - Analgesia and/or epidural at patient request    Trisha Kohler MD  2025  2:17 AM         [1]   Allergies  Allergen Reactions    Cucumber OTHER (SEE COMMENTS)     Hoarseness    Grass     Plum     Tree, Oak     Watermelon     Latex ITCHING    Shrimp ITCHING

## 2025-02-01 NOTE — ANESTHESIA PREPROCEDURE EVALUATION
PRE-OP EVALUATION    Patient Name: Aury Abarca    Admit Diagnosis: Pregnancy (HCC) [Z34.90]    Pre-op Diagnosis: * No surgery found *        Anesthesia Procedure: LABOR ANALGESIA    * Surgery not found *    Pre-op vitals reviewed.  Temp: 97.8 °F (36.6 °C)  Pulse: 60  Resp: 18  BP: 149/87  SpO2: 98 %  Body mass index is 35.14 kg/m².    Current medications reviewed.  Hospital Medications:   lactated ringers IV bolus 1,000 mL  1,000 mL Intravenous Once    fentaNYL-bupivacaine 2 mcg/mL-0.125% in sodium chloride 0.9% 100 mL EPIDURAL infusion premix  12 mL/hr Epidural Continuous    fentaNYL (Sublimaze) 50 mcg/mL injection 100 mcg  100 mcg Epidural Once    lidocaine 1.5%-EPINEPHrine 1:200,000 (Xylocaine-Epinephrine) injection  5 mL Injection PRN    bupivacaine PF (Marcaine) 0.25% injection  30 mL Injection PRN    lidocaine PF (Xylocaine-MPF) 2% injection  5 mL Injection PRN    sodium chloride 0.9% PF injection 10 mL  10 mL Injection PRN    ePHEDrine (PF) 25 MG/5 ML injection 5 mg  5 mg Intravenous PRN    nalbuphine (Nubain) 10 mg/mL injection 2.5 mg  2.5 mg Intravenous Q15 Min PRN    lactated ringers infusion   Intravenous Continuous    dextrose in lactated ringers 5% infusion   Intravenous PRN    lactated ringers IV bolus 500 mL  500 mL Intravenous PRN    acetaminophen (Tylenol Extra Strength) tab 500 mg  500 mg Oral Q6H PRN    acetaminophen (Tylenol Extra Strength) tab 1,000 mg  1,000 mg Oral Q6H PRN    ibuprofen (Motrin) tab 600 mg  600 mg Oral Once PRN    ondansetron (Zofran) 4 MG/2ML injection 4 mg  4 mg Intravenous Q6H PRN    oxyTOCIN in sodium chloride 0.9% (Pitocin) 30 Units/500mL infusion premix  62.5-900 sandra-units/min Intravenous Continuous    terbutaline (Brethine) 1 MG/ML injection 0.25 mg  0.25 mg Subcutaneous PRN    sodium citrate-citric acid (Bicitra) 500-334 MG/5ML oral solution 30 mL  30 mL Oral PRN    oxyTOCIN in sodium chloride 0.9% (Pitocin) 30 Units/500mL infusion premix  0.5-20  sandra-units/min Intravenous Continuous    labetalol (Normodyne) tab 600 mg  600 mg Oral 2 times per day    HYDROmorphone (Dilaudid) 1 MG/ML injection 1 mg  1 mg Intravenous Q3H PRN       Outpatient Medications:   Prescriptions Prior to Admission[1]    Allergies: Cucumber; Grass; Plum; Tree, oak; Watermelon; Latex; and Shrimp      Anesthesia Evaluation    Patient summary reviewed.    Anesthetic Complications  (-) history of anesthetic complications         GI/Hepatic/Renal                                 Cardiovascular        Exercise tolerance: good     MET: >4      (+) hypertension                                     Endo/Other      (+) diabetes  gestational,       (+) anemia        (-) thrombocytopenia           Pulmonary      (+) asthma              (-) sleep apnea       Neuro/Psych    Negative neuro/psych ROS.                                  Past Surgical History:   Procedure Laterality Date    Hip arthro w/labral repair Left 03/2020    Ivf package      Salpingectomy Right 04/2023    ectopic pregnancy     Social History     Socioeconomic History    Marital status:    Tobacco Use    Smoking status: Never    Smokeless tobacco: Never   Vaping Use    Vaping status: Never Used   Substance and Sexual Activity    Alcohol use: Not Currently     Comment: socially     Drug use: No     History   Drug Use No     Available pre-op labs reviewed.  Lab Results   Component Value Date    WBC 10.3 01/31/2025    RBC 4.12 01/31/2025    HGB 11.0 (L) 01/31/2025    HCT 33.7 (L) 01/31/2025    MCV 81.8 01/31/2025    MCH 26.7 01/31/2025    MCHC 32.6 01/31/2025    RDW 12.5 01/31/2025    .0 01/31/2025     Lab Results   Component Value Date     01/31/2025    K 4.0 01/31/2025     01/31/2025    CO2 22.0 01/31/2025    BUN 9 01/31/2025    CREATSERUM 0.74 01/31/2025     (H) 01/31/2025    CA 9.4 01/31/2025            Airway      Mallampati: II  Mouth opening: >3 FB  TM distance: > 6 cm  Neck ROM: full  Cardiovascular    Cardiovascular exam normal.         Dental    Dentition appears grossly intact         Pulmonary    Pulmonary exam normal.                 Other findings              ASA: 2   Plan: epidural  NPO status verified and         Comment: Failure to progress  Plan/risks discussed with: patient  Use of blood product(s) discussed with: patient    Consented to blood products.          Present on Admission:  **None**             [1]   Medications Prior to Admission   Medication Sig Dispense Refill Last Dose/Taking    labetalol 200 MG Oral Tab Take 3 tablets (600 mg total) by mouth 2 (two) times daily.   1/31/2025    aspirin 81 MG Oral Chew Tab Chew 1 tablet (81 mg total) by mouth in the morning and 1 tablet (81 mg total) before bedtime.   Past Week    prenatal vitamin with DHA 27-0.8-228 MG Oral Cap Take 1 capsule by mouth daily.   1/31/2025    EPINEPHrine (AUVI-Q) 0.3 MG/0.3ML Injection Solution Auto-injector Inject 0.3 mL (1 each total) as directed as needed. For anaphylaxis and call 911, [phone] 1 each 3 More than a month    Albuterol Sulfate HFA (PROAIR HFA) 108 (90 Base) MCG/ACT Inhalation Aero Soln INHALE 2 PUFFS BY MOUTH INTO THE LUNGS EVERY 6 HOURS AS NEEDED FOR WHEEZING 8.5 g 1 More than a month

## 2025-02-01 NOTE — PROGRESS NOTES
at 37 & 1/7 weeks gestation presents to room 106 for a scheduled cytotec IOL for gestational hypertension and gestational diabetes diet controlled. Patient reports + fetal movement. Denies cramping, cora, bleeding or leaking fluid. Patient denies signs and symptoms of pre-eclampsia. Plan of care discussed and call light within reach.

## 2025-02-01 NOTE — PROGRESS NOTES
Labor Progress Note    Pt feeling well.   Temp:  [98.2 °F (36.8 °C)] 98.2 °F (36.8 °C)  Pulse:  [69-92] 69  Resp:  [18] 18  BP: (144-171)/() 146/93  FHT:  baseline 150s, mod variability, accels appreciated, no decels  [place]:  irregular  SVE: 1-2cm/80%/-1sta, cephalic    Cook balloon placed digitally, each side filled with 60ml of saline. Pt toelrated well  ASSESSMENT:  37w2d  CHTN with possible superimposed preE without severe features  PLAN:  Continue home bp meds  S/p cytotec and now with placement of cervical balloon  Start pitocin  Epidural prn

## 2025-02-01 NOTE — PROGRESS NOTES
Labor Progress Note    Pt feeling well. Comfortable with epidural.   Temp:  [97.8 °F (36.6 °C)-98.2 °F (36.8 °C)] 97.8 °F (36.6 °C)  Pulse:  [60-92] 61  Resp:  [18] 18  BP: (116-171)/() 122/67  SpO2:  [97 %-98 %] 98 %  FHT:  baseline 150s, mod variability, accels appreciated, no decels  [place]:  \q2 min  SVE: Cook removed. Cervix 3-4/70/-1  AROM performed with clear fluid, pt tolerated well.     ASSESSMENT:  37w2d  CHTN with possible superimposed preE without severe features  PLAN:  Continue home bp meds  Continue pitocin   Comfortable with epidural

## 2025-02-01 NOTE — PLAN OF CARE
Problem: Patient/Family Goals  Goal: Patient/Family Long Term Goal  Description: Patient's Long Term Goal: Safe and uncomplicated delivery    Interventions:  - See additional Care Plan goals for specific interventions  Outcome: Progressing  Goal: Patient/Family Short Term Goal  Description: Patient's Short Term Goal: Adequate pain control    Interventions:   - See additional Care Plan goals for specific interventions  Outcome: Progressing     Problem: BIRTH - VAGINAL/ SECTION  Goal: Fetal and maternal status remain reassuring during the birth process  Description: INTERVENTIONS:  - Monitor vital signs  - Monitor fetal heart rate  - Monitor uterine activity  - Monitor labor progression (vaginal delivery)  - DVT prophylaxis (C/S delivery)  - Surgical antibiotic prophylaxis (C/S delivery)  Outcome: Progressing     Problem: PAIN - ADULT  Goal: Verbalizes/displays adequate comfort level or patient's stated pain goal  Description: INTERVENTIONS:  - Encourage pt to monitor pain and request assistance  - Assess pain using appropriate pain scale  - Administer analgesics based on type and severity of pain and evaluate response  - Implement non-pharmacological measures as appropriate and evaluate response  - Consider cultural and social influences on pain and pain management  - Manage/alleviate anxiety  - Utilize distraction and/or relaxation techniques  - Monitor for opioid side effects  - Notify MD/LIP if interventions unsuccessful or patient reports new pain  - Anticipate increased pain with activity and pre-medicate as appropriate  Outcome: Progressing     Problem: ANXIETY  Goal: Will report anxiety at manageable levels  Description: INTERVENTIONS:  - Administer medication as ordered  - Teach and rehearse alternative coping skills  - Provide emotional support with 1:1 interaction with staff  Outcome: Progressing

## 2025-02-01 NOTE — ANESTHESIA PROCEDURE NOTES
Labor Analgesia    Date/Time: 2/1/2025 11:31 AM    Performed by: Flakito Elizondo MD  Authorized by: Flakito Elizondo MD      General Information and Staff    Start Time:  2/1/2025 11:31 AM  End Time:  2/1/2025 11:54 AM  Anesthesiologist:  Flakito Elizondo MD  Performed by:  Anesthesiologist  Patient Location:  OB  Site Identification: surface landmarks    Reason for Block: labor epidural    Preanesthetic Checklist: patient identified, IV checked, risks and benefits discussed, monitors and equipment checked, pre-op evaluation, timeout performed, IV bolus, anesthesia consent and sterile technique used      Procedure Details    Patient Position:  Sitting  Prep: ChloraPrep    Monitoring:  Heart rate and continuous pulse ox  Approach:  Midline    Epidural Needle    Injection Technique:  LAURA air  Needle Type:  Tuohy  Needle Gauge:  17 G  Needle Length:  3.375 in  Location:  L3-4    Spinal Needle      Catheter    Catheter Type:  End hole  Catheter Size:  19 G  Test Dose:  Negative    Assessment      Additional Comments

## 2025-02-02 NOTE — PROGRESS NOTES
Labor Progress Note    Pt feeling well. Comfortable with epidural.   Temp:  [97.3 °F (36.3 °C)-98.1 °F (36.7 °C)] 98.1 °F (36.7 °C)  Pulse:  [55-80] 67  Resp:  [16-18] 16  BP: (113-173)/() 139/81  SpO2:  [97 %-98 %] 98 %  FHT:  baseline 150s, mod variability, accels appreciated, no decels  [place]:  \q2 min  SVE: 4-5/70/-2  IUPC placed by self at 3 oclock, advanced with clear fluid    ASSESSMENT:  37w2d  CHTN with possible superimposed preE without severe features  PLAN:  Continue home bp meds  Continue pitocin   Comfortable with epidural  Discussed failed induction ins 12-18 hours of no change despite pitocin and  AROM

## 2025-02-02 NOTE — OPERATIVE REPORT
[facility]   Section - Operative Note    Aury Abarca Patient Status:  Inpatient    1995 MRN [id number]   Location [facility] CARDIOVASCULAR OR PRE OP Attending Trisha Kohler MD   Hosp Day # 2 PCP No primary care provider on file.       Preoperative Diagnosis: Pt request for primary        Postoperative Diagnosis: Same - Delivered    Procedure: Low Transverse  Section    Primary surgeon: Rubi Boogie MD    Assistant: Dr. Murray  The involvement of the assisting physician was necessary in order to provide aid in exposure/retraction, hemostasis, closure, and other intraoperative technical functions in order to facilitate me as the primary surgeon carry out a safe operation with optimized results/outcome.    Indications:  29 yo  at 37+3 wga who was admitted for IOL 2/2 GDMA1, CHTN on meds. She was induced with cytotec, cook balloon, arom and pitocin. She did not make cervical change from 4-5 cm for around 10 hours and requested primary  section.     Surgical Findings:     Baby: male      2430  APGAR 1':  9  APGAR 5':  9    Grossly normal appearing placenta with centrally-inserted 3 vessel cord  Clear amniotic fluid  Grossly normal appearing bilateral tubes and ovaries      Anesthesia: epidural    Qbl pending    Procedure:       The patient was taken to the operating room where she was properly identified to the OR staff and attending physician. She had already received epidural anesthesia during her labor course, which was augmented preoperatively.    Fetal heart tones were appreciated and found to be appropriate. A Castellanos catheter was aseptically inserted and SCDs were placed.  The abdomen was prepped with Chloraprep. Following appropriate drying time, the patient was draped in the usual sterile manner for a Pfannenstiel incision.  The patient had received Ancef and Azithromycin IV pre-operatively for prophylaxis.  A Time Out was held  and the above information confirmed.  The patient was identified as Aury Abarca and the procedure verified as  Delivery.    A Pfannenstiel incision was made and carried down through the underlying subcutaneous tissue to the fascia using a scalpel. Rectus fascia was then incised in the midline and extended laterally with blunt countertraction. The rectus muscles were  and the peritoneum was identified and subsequently entered and extended longitudinally with blunt dissection. The vesicouterine peritoneum was identified.  The Abdirizak retractor was inserted.      A low transverse uterine incision was made with the scalpel and extended laterally with blunt dissection. The amnion was entered sharply.  The surgeon's hand was inserted through the hysterotomy and the fetal head was palpated, elevated, and delivered through the uterine incision with the assistance of fundal pressure.  Baby had a  spontaneous cry with good color and tone.  The umbilical cord was clamped and cut.  The infant was handed off to the  providers. cord blood, and a segment of umbilical cord were obtained for evaluation and promptly sent to the lab.  The placenta delivered spontaneously with uterine fundal massage and was noted to have a centrally inserted 3 vessel cord.     A moist lap sponge was used to clear the uterine cavity of clots and products of conception. The uterine incision was closed with a running suture of 0 monocryl.  Good hemostasis was confirmed upon uterine closure.  The Abdirizak retractor was removed. The peritoneum was closed with 2.0 vicryl.  The fascia was closed with a running suture of 0 Vicryl. The subcutaneous tissue was irritated. Subcutaneous tissues were closed with 3-0 Vicryl suture. The skin was closed with 4-0 Monocryl in a subcuticular fashion.    At the conclusion of the procedure, all needle, sponge, and instrument counts were noted to be correct x2.  The patient  tolerated the procedure well and was transferred to her the recovery room in stable condition.     Specimen:  none    Drains: dickerson    Condition:   stable    Complications: None; patient tolerated the procedure well.      Rubi Boogie MD  2025  3:17 AM    Otis Abarca [[id number]]      Labor Events     labor?: No   steroids?: None  Rupture date/time: 2025 1311     Rupture type: AROM  Fluid color: Clear  Labor type: Induced Onset of Labor  Induction: Misoprostol, Oxytocin, Cervical Ripening Balloon, AROM  Indications for induction: Gestational Hypertension, IDDM/GDDM  Intrapartum & labor complications: Gestational hypertension       Labor Event Times    Labor onset date/time: 2025 0900       [place] Presentation    Presentation: Vertex       Operative Delivery    Operative Vaginal Delivery: N/A                Shoulder Dystocia    Shoulder Dystocia: N/A       Anesthesia    Method: Epidural               Delivery      Head delivery date/time: 2025 02:48:28   Delivery date/time:  25 02:48:34   Delivery type: Caesarean Section    Details:   categorization: Primary    priority: unscheduled   Indications for : Other - Add Comments   Other Comment for Indications for : maternal request   Skin incision type: 1 Pfannenstiel   Uterine Incision type: Low Transverse      Delivery location: OR       Delivery Providers    Delivering Clinician: Rubi Boogie MD   Delivery personnel:  Provider Role   Teodora Bill, RN Baby Nurse   Winsome Platt RN Delivery Nurse             Cord    Vessels: 3 Vessels  Complications: None  Timed cord clamping: Yes  Time in sec: 30  Cord blood disposition: to lab  Gases sent?: No       Resuscitation    Method: None        Measurements      Weight: 2430 g 5 lb 5.7 oz Length: 47 cm     Head circum.: 32 cm Chest circum.: 30 cm      Abdominal circum.: 26.5 cm           Placenta    Date/time:  2025 0249  Removal: Manual Removal  Appearance: Intact  Disposition: held for future pathology       Apgars    Living status: Living   Apgar Scoring Key:    0 1 2    Skin color Blue or pale Acrocyanotic Completely pink    Heart rate Absent <100 bpm >100 bpm    Reflex irritability No response Grimace Cry or active withdrawal    Muscle tone Limp Some flexion Active motion    Respiratory effort Absent Weak cry; hypoventilation Good, crying              1 Minute:  5 Minute:  10 Minute:  15 Minute:  20 Minute:      Skin color: 1  1       Heart rate: 2  2       Reflex irritablity: 2  2       Muscle tone: 2  2       Respiratory effort: 2  2       Total: 9  9          [place] disposition: with mother       Skin to Skin    Skin to skin with: Mother       Vaginal Count    No data filed       Lacerations    Episiotomy: None  Perineal lacerations: None      Vaginal laceration?: No      Cervical laceration?: No    Clitoral laceration?: No

## 2025-02-02 NOTE — ANESTHESIA POSTPROCEDURE EVALUATION
[facility]    Aury Abarca Patient Status:  Inpatient   Age/Gender 30 year old female MRN [id number]   Location [facility] LABOR & DELIVERY Attending Trisha Kohler MD   Hosp Day # 2 PCP No primary care provider on file.       Anesthesia Post-op Note     SECTION    Procedure Summary       Date: 25 Room / Location:  L+D OR 03 /  L+D OR    Anesthesia Start: 1131 Anesthesia Stop: 25 0330    Procedure:  SECTION (Abdomen) Diagnosis:     Surgeons: Rubi Boogie MD Anesthesiologist: Archie Vines MD    Anesthesia Type: epidural ASA Status: 2            Anesthesia Type: epidural    Vitals Value Taken Time   / 86 25 0331   Temp 97 25 0331   Pulse 76 25 0330   Resp 22 25 0330   SpO2 97 % 25 0330   Vitals shown include unfiled device data.        Patient Location: PACU    Anesthesia Type: epidural    Airway Patency: patent    Postop Pain Control: adequate    Mental Status: preanesthetic baseline    Nausea/Vomiting: none    Cardiopulmonary/Hydration status: stable euvolemic    Complications: no apparent anesthesia related complications    Postop vital signs: stable    Comments: Report given to RN    Dental Exam: Unchanged from Preop    Patient to be discharged from PACU when criteria met.

## 2025-02-02 NOTE — PLAN OF CARE
Problem: Patient/Family Goals  Goal: Patient/Family Long Term Goal  Description: Patient's Long Term Goal: Safe and uncomplicated delivery    Interventions:  - See additional Care Plan goals for specific interventions  Outcome: Progressing  Goal: Patient/Family Short Term Goal  Description: Patient's Short Term Goal: Adequate pain control    Interventions:   - See additional Care Plan goals for specific interventions  Outcome: Progressing     Problem: BIRTH - VAGINAL/ SECTION  Goal: Fetal and maternal status remain reassuring during the birth process  Description: INTERVENTIONS:  - Monitor vital signs  - Monitor fetal heart rate  - Monitor uterine activity  - Monitor labor progression (vaginal delivery)  - DVT prophylaxis (C/S delivery)  - Surgical antibiotic prophylaxis (C/S delivery)  Outcome: Progressing     Problem: PAIN - ADULT  Goal: Verbalizes/displays adequate comfort level or patient's stated pain goal  Description: INTERVENTIONS:  - Encourage pt to monitor pain and request assistance  - Assess pain using appropriate pain scale  - Administer analgesics based on type and severity of pain and evaluate response  - Implement non-pharmacological measures as appropriate and evaluate response  - Consider cultural and social influences on pain and pain management  - Manage/alleviate anxiety  - Utilize distraction and/or relaxation techniques  - Monitor for opioid side effects  - Notify MD/LIP if interventions unsuccessful or patient reports new pain  - Anticipate increased pain with activity and pre-medicate as appropriate  Outcome: Progressing     Problem: POSTPARTUM  Goal: Long Term Goal:Experiences normal postpartum course  Description: INTERVENTIONS:  - Assess and monitor vital signs and lab values.  - Assess fundus and lochia.  - Provide ice/sitz baths for perineum discomfort.  - Monitor healing of incision/episiotomy/laceration, and assess for signs and symptoms of infection and hematoma.  - Assess  bladder function and monitor for bladder distention.  - Provide/instruct/assist with pericare as needed.  - Provide VTE prophylaxis as needed.  - Monitor bowel function.  - Encourage ambulation and provide assistance as needed.  - Assess and monitor emotional status and provide social service/psych resources as needed.  - Utilize standard precautions and use personal protective equipment as indicated. Ensure aseptic care of all intravenous lines and invasive tubes/drains.  - Obtain immunization and exposure to communicable diseases history.  Outcome: Progressing  Goal: Optimize infant feeding at the breast  Description: INTERVENTIONS:  - Initiate breast feeding within first hour after birth.   - Monitor effectiveness of current breast feeding efforts.  - Assess support systems available to mother/family.  - Identify cultural beliefs/practices regarding lactation, letdown techniques, maternal food preferences.  - Assess mother's knowledge and previous experience with breast feeding.  - Provide information as needed about early infant feeding cues (e.g., rooting, lip smacking, sucking fingers/hand) versus late cue of crying.  - Discuss/demonstrate breast feeding aids (e.g., infant sling, nursing footstool/pillows, and breast pumps).  - Encourage mother/other family members to express feelings/concerns, and actively listen.  - Educate father/SO about benefits of breast feeding and how to manage common lactation challenges.  - Recommend avoidance of specific medications or substances incompatible with breast feeding.  - Assess and monitor for signs of nipple pain/trauma.  - Instruct and provide assistance with proper latch.  - Review techniques for milk expression (breast pumping) and storage of breast milk. Provide pumping equipment/supplies, instructions and assistance, as needed.  - Encourage rooming-in and breast feeding on demand.  - Encourage skin-to-skin contact.  - Provide LC support as needed.  - Assess for and  manage engorgement.  - Provide breast feeding education handouts and information on community breast feeding support.   Outcome: Progressing  Goal: Establishment of adequate milk supply with medication/procedure interruptions  Description: INTERVENTIONS:  - Review techniques for milk expression (breast pumping).   - Provide pumping equipment/supplies, instructions, and assistance until it is safe to breastfeed infant.  Outcome: Progressing  Goal: Appropriate maternal -  bonding  Description: INTERVENTIONS:  - Assess caregiver- interactions.  - Assess caregiver's emotional status and coping mechanisms.  - Encourage caregiver to participate in  daily care.  - Assess support systems available to mother/family.  - Provide /case management support as needed.  Outcome: Progressing

## 2025-02-02 NOTE — PROGRESS NOTES
Pt transferred to post partum room 1112 via cart holding infant. Pt accompanied by . Pt transferred with all personal belongings, voicing no complaints upon transfer. Pt in stable condition. Report given to Deja PRATT, all care relinquished at this time. Baby bands matched and verified with receiving RN and parents.

## 2025-02-02 NOTE — PROGRESS NOTES
Pt feeling defeated and frustrated. FHT currently cat 1. Ctx are adequate. Cervix is unchanged. Pt feels uncomfortable and would like to proceed with  section. D/w patient does not fit criteria for failed induction so indication would be maternal request, pt is ok with this. Reviewed r/b of procedure including bleeding/infection/ injury to nearby organs including bowel/bladder/ureters.

## 2025-02-02 NOTE — PROGRESS NOTES
NURSING ADMISSION NOTE      Patient admitted via Cart  Oriented to room.  Safety precautions initiated.  Bed in low position.  Call light in reach.    ID bands matched with RNx2

## 2025-02-03 NOTE — PLAN OF CARE
Problem: BIRTH - VAGINAL/ SECTION  Goal: Fetal and maternal status remain reassuring during the birth process  Description: INTERVENTIONS:  - Monitor vital signs  - Monitor fetal heart rate  - Monitor uterine activity  - Monitor labor progression (vaginal delivery)  - DVT prophylaxis (C/S delivery)  - Surgical antibiotic prophylaxis (C/S delivery)  Outcome: Completed

## 2025-02-03 NOTE — PLAN OF CARE
Problem: POSTPARTUM  Goal: Long Term Goal:Experiences normal postpartum course  Description: INTERVENTIONS:  - Assess and monitor vital signs and lab values.  - Assess fundus and lochia.  - Provide ice/sitz baths for perineum discomfort.  - Monitor healing of incision/episiotomy/laceration, and assess for signs and symptoms of infection and hematoma.  - Assess bladder function and monitor for bladder distention.  - Provide/instruct/assist with pericare as needed.  - Provide VTE prophylaxis as needed.  - Monitor bowel function.  - Encourage ambulation and provide assistance as needed.  - Assess and monitor emotional status and provide social service/psych resources as needed.  - Utilize standard precautions and use personal protective equipment as indicated. Ensure aseptic care of all intravenous lines and invasive tubes/drains.  - Obtain immunization and exposure to communicable diseases history.  Outcome: Progressing  Goal: Optimize infant feeding at the breast  Description: INTERVENTIONS:  - Initiate breast feeding within first hour after birth.   - Monitor effectiveness of current breast feeding efforts.  - Assess support systems available to mother/family.  - Identify cultural beliefs/practices regarding lactation, letdown techniques, maternal food preferences.  - Assess mother's knowledge and previous experience with breast feeding.  - Provide information as needed about early infant feeding cues (e.g., rooting, lip smacking, sucking fingers/hand) versus late cue of crying.  - Discuss/demonstrate breast feeding aids (e.g., infant sling, nursing footstool/pillows, and breast pumps).  - Encourage mother/other family members to express feelings/concerns, and actively listen.  - Educate father/SO about benefits of breast feeding and how to manage common lactation challenges.  - Recommend avoidance of specific medications or substances incompatible with breast feeding.  - Assess and monitor for signs of nipple  pain/trauma.  - Instruct and provide assistance with proper latch.  - Review techniques for milk expression (breast pumping) and storage of breast milk. Provide pumping equipment/supplies, instructions and assistance, as needed.  - Encourage rooming-in and breast feeding on demand.  - Encourage skin-to-skin contact.  - Provide LC support as needed.  - Assess for and manage engorgement.  - Provide breast feeding education handouts and information on community breast feeding support.   Outcome: Progressing  Goal: Establishment of adequate milk supply with medication/procedure interruptions  Description: INTERVENTIONS:  - Review techniques for milk expression (breast pumping).   - Provide pumping equipment/supplies, instructions, and assistance until it is safe to breastfeed infant.  Outcome: Progressing  Goal: Appropriate maternal -  bonding  Description: INTERVENTIONS:  - Assess caregiver- interactions.  - Assess caregiver's emotional status and coping mechanisms.  - Encourage caregiver to participate in  daily care.  - Assess support systems available to mother/family.  - Provide /case management support as needed.  Outcome: Progressing     Problem: ANXIETY  Goal: Will report anxiety at manageable levels  Description: INTERVENTIONS:  - Administer medication as ordered  - Teach and rehearse alternative coping skills  - Provide emotional support with 1:1 interaction with staff  Outcome: Completed     Problem: PAIN - ADULT  Goal: Verbalizes/displays adequate comfort level or patient's stated pain goal  Description: INTERVENTIONS:  - Encourage pt to monitor pain and request assistance  - Assess pain using appropriate pain scale  - Administer analgesics based on type and severity of pain and evaluate response  - Implement non-pharmacological measures as appropriate and evaluate response  - Consider cultural and social influences on pain and pain management  - Manage/alleviate  anxiety  - Utilize distraction and/or relaxation techniques  - Monitor for opioid side effects  - Notify MD/LIP if interventions unsuccessful or patient reports new pain  - Anticipate increased pain with activity and pre-medicate as appropriate  Outcome: Progressing

## 2025-02-03 NOTE — PROGRESS NOTES
[facility] 1SW-J gema    Aury Abarca Patient Status:  Inpatient   Age/Gender 30 year old female MRN [id number]   Location [facility] 1SW-J Attending Trisha Kohler MD   Hosp Day # 3 PCP No primary care provider on file.      Anesthesia Pain Progress Note    Anesthesia Technique:   Epidural Anesthesia     Pain Management Technique:  In addition to available oral supplemental and IV medications  Patient received neuraxial preservative free morphine for post procedural pain control.    Post Procedure Pain Quality:    Adequate    Pain Management Side Effects:  None     /85 (BP Location: Left arm)   Pulse 73   Temp 97.9 °F (36.6 °C) (Oral)   Resp 14   Wt 78.9 kg (174 lb)   SpO2 98%   Breastfeeding Yes   BMI 35.14 kg/m²       Injection Site: Injection site is intact on inspection     Complications from Pain Management or Anesthesia:   None    All patient questions were answered.  Follow up pain management is separate from intraoperative anesthetic needs.  Pain care is transitioned to primary service, with management by oral medications.    Thank you for asking us to participate in the care of your patient.    Andrez Reeves MD, 25, 12:20 PM      Andrez Reeves MD, 25, 12:20 PM

## 2025-02-03 NOTE — PROGRESS NOTES
S: pt without complaints.  pos flatus  O: VS-Temp:  [97.5 °F (36.4 °C)-97.9 °F (36.6 °C)] 97.9 °F (36.6 °C)  Pulse:  [57-76] 59  Resp:  [14-16] 14  BP: (121-174)/(75-97) 145/87  SpO2:  [97 %-100 %] 98 %       I/O last 24 hours-  Intake/Output Summary (Last 24 hours) at 2/3/2025 0741  Last data filed at 2/2/2025 2200  Gross per 24 hour   Intake 420 ml   Output 975 ml   Net -555 ml          Abdomen: soft, ND, NT  Incision- CDI       Extremities: no edema, NT Bilateral lower extremities       CBC:    A/P:      1. POD#1 s/p C/S      2. Doing well      3. HTN- controlled with labetalol 600mg BID       4. Informed consent for circumcision obtained from parent

## 2025-02-04 NOTE — PLAN OF CARE
Problem: PAIN - ADULT  Goal: Verbalizes/displays adequate comfort level or patient's stated pain goal  Description: INTERVENTIONS:  - Encourage pt to monitor pain and request assistance  - Assess pain using appropriate pain scale  - Administer analgesics based on type and severity of pain and evaluate response  - Implement non-pharmacological measures as appropriate and evaluate response  - Consider cultural and social influences on pain and pain management  - Manage/alleviate anxiety  - Utilize distraction and/or relaxation techniques  - Monitor for opioid side effects  - Notify MD/LIP if interventions unsuccessful or patient reports new pain  - Anticipate increased pain with activity and pre-medicate as appropriate  Outcome: Completed     Problem: POSTPARTUM  Goal: Long Term Goal:Experiences normal postpartum course  Description: INTERVENTIONS:  - Assess and monitor vital signs and lab values.  - Assess fundus and lochia.  - Provide ice/sitz baths for perineum discomfort.  - Monitor healing of incision/episiotomy/laceration, and assess for signs and symptoms of infection and hematoma.  - Assess bladder function and monitor for bladder distention.  - Provide/instruct/assist with pericare as needed.  - Provide VTE prophylaxis as needed.  - Monitor bowel function.  - Encourage ambulation and provide assistance as needed.  - Assess and monitor emotional status and provide social service/psych resources as needed.  - Utilize standard precautions and use personal protective equipment as indicated. Ensure aseptic care of all intravenous lines and invasive tubes/drains.  - Obtain immunization and exposure to communicable diseases history.  Outcome: Completed  Goal: Optimize infant feeding at the breast  Description: INTERVENTIONS:  - Initiate breast feeding within first hour after birth.   - Monitor effectiveness of current breast feeding efforts.  - Assess support systems available to mother/family.  - Identify  cultural beliefs/practices regarding lactation, letdown techniques, maternal food preferences.  - Assess mother's knowledge and previous experience with breast feeding.  - Provide information as needed about early infant feeding cues (e.g., rooting, lip smacking, sucking fingers/hand) versus late cue of crying.  - Discuss/demonstrate breast feeding aids (e.g., infant sling, nursing footstool/pillows, and breast pumps).  - Encourage mother/other family members to express feelings/concerns, and actively listen.  - Educate father/SO about benefits of breast feeding and how to manage common lactation challenges.  - Recommend avoidance of specific medications or substances incompatible with breast feeding.  - Assess and monitor for signs of nipple pain/trauma.  - Instruct and provide assistance with proper latch.  - Review techniques for milk expression (breast pumping) and storage of breast milk. Provide pumping equipment/supplies, instructions and assistance, as needed.  - Encourage rooming-in and breast feeding on demand.  - Encourage skin-to-skin contact.  - Provide LC support as needed.  - Assess for and manage engorgement.  - Provide breast feeding education handouts and information on community breast feeding support.   Outcome: Completed  Goal: Establishment of adequate milk supply with medication/procedure interruptions  Description: INTERVENTIONS:  - Review techniques for milk expression (breast pumping).   - Provide pumping equipment/supplies, instructions, and assistance until it is safe to breastfeed infant.  Outcome: Completed  Goal: Appropriate maternal -  bonding  Description: INTERVENTIONS:  - Assess caregiver- interactions.  - Assess caregiver's emotional status and coping mechanisms.  - Encourage caregiver to participate in  daily care.  - Assess support systems available to mother/family.  - Provide /case management support as needed.  Outcome: Completed

## 2025-02-04 NOTE — PROGRESS NOTES
Postop Day 2    Pt without complaints.     Temp:  [97.7 °F (36.5 °C)-98.1 °F (36.7 °C)] 97.7 °F (36.5 °C)  Pulse:  [63-73] 65  Resp:  [12-16] 12  BP: (127-147)/(74-85) 147/81  Patient Vitals for the past 24 hrs:   BP Temp Temp src Pulse Resp   02/04/25 0735 147/81 97.7 °F (36.5 °C) Oral 65 12   02/04/25 0506 137/77 -- -- 67 --   02/03/25 2305 136/83 -- -- 69 --   02/03/25 1928 140/74 98.1 °F (36.7 °C) Oral 63 16   02/03/25 1623 127/81 -- -- 72 --   02/03/25 1205 132/85 -- -- 73 --     abd  soft, NT, ND, fundus firm below umbilicus, incision C/D/I  extr  trace edema, no calf tenderness    Recent Labs   Lab 01/31/25  1924 02/03/25  0746   RBC 4.12 3.40*   HGB 11.0* 9.2*   HCT 33.7* 28.0*   MCV 81.8 82.4   MCH 26.7 27.1   MCHC 32.6 32.9   RDW 12.5 12.8   NEPRELIM 7.30 9.20*   WBC 10.3 12.6*   .0 263.0     Impression: POD#2    HTN  Plan:  Labetalol 600 mg po bid, will increase to 800 po bid  Continue postop care.    Anticipate discharge home tomorrow.

## 2025-02-05 NOTE — PROGRESS NOTES
Postop Day 3    Baby in NICU. Pt breastfeeding, passing gas, voiding. No BM. Hasn't been taking narcotics, worse pain at night when bp is high.     Temp:  [97.6 °F (36.4 °C)-98 °F (36.7 °C)] 97.6 °F (36.4 °C)  Pulse:  [61-88] 61  Resp:  [16] 16  BP: (127-173)/() 147/89  Patient Vitals for the past 24 hrs:   BP Temp Temp src Pulse Resp   02/04/25 0735 147/81 97.7 °F (36.5 °C) Oral 65 12   02/04/25 0506 137/77 -- -- 67 --   02/03/25 2305 136/83 -- -- 69 --   02/03/25 1928 140/74 98.1 °F (36.7 °C) Oral 63 16   02/03/25 1623 127/81 -- -- 72 --   02/03/25 1205 132/85 -- -- 73 --     abd  soft, NT, ND, fundus firm below umbilicus, incision C/D/I  extr  trace edema, no calf tenderness    Recent Labs   Lab 01/31/25  1924 02/03/25  0746   RBC 4.12 3.40*   HGB 11.0* 9.2*   HCT 33.7* 28.0*   MCV 81.8 82.4   MCH 26.7 27.1   MCHC 32.6 32.9   RDW 12.5 12.8   NEPRELIM 7.30 9.20*   WBC 10.3 12.6*   .0 263.0     Impression: POD#3    HTN  Plan:  Labetalol 800 bid increased yesterday. Procardia 30 xl added early this morning.  Continue postop care.    Anticipate discharge home tomorrow pending bp.

## 2025-02-06 NOTE — DISCHARGE SUMMARY
[facility]  OB/GYN Surgeon Discharge Summary    Aury Abarca Patient Status:  Inpatient    1995 MRN [id number]   Location [facility] 1SW-J Attending Trisha Kohler MD   Hosp Day # 6 PCP No primary care provider on file.       Admit date: 2025    Discharge date : 25    Admitting Physician: Trisha Kohler MD   Discharging Physician: Millie Contreras DO     Reason for admission:   Pregnancy at 37w2d gestation  Chronic hypertension with superimposed pre-eclampsia without severe features  Pregnancy conceived with IVF  Diet controlled gestational diabetes    Procedures:   Primary low transverse  section    Hospital Course:   Aury Abarca is a 30 year old  who presented to L&D at 37w2d gestation for scheduled induction of labor due to chronic hypertension with superimposed pre-eclampsia without severe feature.  Pregnancy further complicated by diet controlled gestational diabetes and conception by IVF.  She underwent an uncomplicated primary  section due to maternal request with slow cervical change during induction.  Postpartum blood pressure medications were titrated and increased.  BP remained stable on labetalol 800mg BID and procardia XL 30mg daily, which were continued on discharge.  She had an otherwise normal postpartum recovery.  She was discharged home in stable condition on postpartum day #4 with returns precautions discussed.      Discharge Diagnoses:  Pregnancy at 37w3d gestation, delivered by  section  Chronic hypertension with superimposed pre-eclampsia without severe features  Pregnancy conceived with IVF  Diet controlled gestational diabetes  Status post primary low transverse  section    Discharged Condition: good    Disposition: home    Patient Instructions:   Follow-up appointment with Dr. Boogie in 1 week for BP check, in 2 weeks for incision check, and in 6 weeks for postpartum  exam.    Millie Contreras, DO  2/6/2025  9:02 AM

## 2025-02-06 NOTE — PROGRESS NOTES
Postpartum Progress Note    SUBJECTIVE:    Post-op day #4 s/p primary  section.    No overnight events.  No complaints.  Ambulating, tolerating PO, voiding, passing flatus.  No HA, vision changes, RUQ pain.      OBJECTIVE:    Vital signs in last 24 hours:  Temp:  [97.9 °F (36.6 °C)-98.1 °F (36.7 °C)] 97.9 °F (36.6 °C)  Pulse:  [60-75] 60  Resp:  [16-18] 16  BP: (123-148)/(69-85) 148/83  Input/Output:  No intake or output data in the 24 hours ending 25 0900    Gen: appears well, nad  Abd: soft, appropriate post-op tenderness, fundus firm below umbilicus  Inc: c/d/i   Erin: minimal lochia  Ext: nontender, trace edema    Recent Labs   Lab 25  1924 25  0746   RBC 4.12 3.40*   HGB 11.0* 9.2*   HCT 33.7* 28.0*   MCV 81.8 82.4   MCH 26.7 27.1   MCHC 32.6 32.9   RDW 12.5 12.8   NEPRELIM 7.30 9.20*   WBC 10.3 12.6*   .0 263.0         ASSESSMENT/PLAN:  POD #4 s/p primary  section  Acute blood loss anemia - asymptomatic, continue PO Fe   cHTN - Labetalol 800mg BID, Procardia XL 30mg daily - BP well controlled   - Discussed home BP monitoring  Recovering well  Discharge home today - instructions reviewed, follow up in 1 week for BP check, 2 weeks for incision check, and 6 weeks for postpartum visit    Millie Contreras DO  2025  8:58 AM

## 2025-02-06 NOTE — DISCHARGE INSTRUCTIONS
Home BP monitoring  Check BP at home 1-2 times daily and keep a log.  Call the office if BP consistently >150/100 or if developing severe headaches, vision changes, or right upper abdominal pain.  Schedule a BP check within 1 week of discharge home.      Pain medications for home:  Take 600mg motrin (ibuprofen) every 6 hours as needed for pain.  Take 1000mg tylenol (acetaminophen) every 6 hours as needed for pain.  Take 300mg gabapentin every 8 hours as needed for breakthrough pain despite tylenol and motrin  Take 5mg oxycodone every 6 hours as needed for breakthrough pain despite tylenol, motrin, gabapentin.

## 2025-02-06 NOTE — CM/SW NOTE
SW met with pt and sps to complete assessment and offer support, as baby boy admitted to NICU.    Pt and sps with cheerful affect. Report they live in [place] and this is their first baby. Report strong support from family, and friends. Report adequate time off work.    Pt denies any hx of anxiety, or depression. Denies any concerns at this time. SW offered support and encouraged pt to reach out to OBGYN/PCP with any further questions, or concerns for PMAD's.    SW reviewed support services for the NICU including Geoff Sandoval family room and sleep room areas, NICU Facebook page, NICU support group and role of NICU  with contact information. SW reviewed Postpartum Depression warning signs and support services.    Is the patient receiving evaluation and/or treatment  21 years of age or younger?  Yes  Are the parents/guardians/caregivers 18 years of age or older? (If not, they must be accompanied by a legal guardian unless legally emancipated.)  Yes  Has the family been cleared of symptoms, diagnosis, or exposure to any infectious disease ( flu, cold, chickenpox, TB, COVID-19, etc) and environmental issues (such as bed bugs, scabies, or lice.)?  Yes  Does anyone in the guest party have an open child abuse or neglect case or a pending investigation for child abuse or neglect?  No  Families must be appropriate for community living- is there a history of substance abuse, domestic violence, or any violent or sex related crimes?   No  All guests 18 years of age and older are required to show a photo ID ('s License, Passport, or State ID). Have you reviewed this requirement with the guests?   Yes  Overnight guests must be referred by the hospital staff & only (2) registered guests are allowed to stay per fire code. Children are not permitted overnight, except breastfeeding infants, with hospital approval. Have you reviewed this information with the family?   Yes    SW to remain available for any dc  planning, or additional need for support.    POLO Moreno  Discharge Planner  [id number]

## 2025-02-07 NOTE — PAYOR COMM NOTE
--------------  REQUESTING APPROVAL FOR ALL DAYS UP TO ( but not including)      PLEASE FAX  DAYS AUTHORIZED -[phone]    THANK YOU!    DISCHARGE REVIEW    Payor: [employer]NA [employer] FRAN[employer]  Subscriber #:  [id number]  Authorization Number: [id number]    Admit date: 25  Admit time:   7:19 PM  Discharge Date: 2025 12:34 PM      OB/GYN Surgeon Discharge Summary    Aury Abarca Patient Status:  Inpatient    1995 MRN [id number]   [facility] 1SW-J Attending Trisha Kohler MD   Hosp Day # 6 PCP No primary care provider on file.       Admit date: 2025    Discharge date : 25    Admitting Physician: Trisha Kohler MD   Discharging Physician: Millie Contreras DO     Reason for admission:   Pregnancy at 37w2d gestation  Chronic hypertension with superimposed pre-eclampsia without severe features  Pregnancy conceived with IVF  Diet controlled gestational diabetes    Procedures:   Primary low transverse  section on 25    Hospital Course:   Aury Abarca is a 30 year old  who presented to L&D at 37w2d gestation for scheduled induction of labor due to chronic hypertension with superimposed pre-eclampsia without severe feature.  Pregnancy further complicated by diet controlled gestational diabetes and conception by IVF.  She underwent an uncomplicated primary  section due to maternal request with slow cervical change during induction.  Postpartum blood pressure medications were titrated and increased.  BP remained stable on labetalol 800mg BID and procardia XL 30mg daily, which were continued on discharge.  She had an otherwise normal postpartum recovery.  She was discharged home in stable condition on postpartum day #4 with returns precautions discussed.      Discharge Diagnoses:  Pregnancy at 37w3d gestation, delivered by  section  Chronic hypertension with superimposed pre-eclampsia without severe features  Pregnancy  conceived with IVF  Diet controlled gestational diabetes  Status post primary low transverse  section    Discharged Condition: good    Disposition: home    Patient Instructions:   Follow-up appointment with Dr. Boogie in 1 week for BP check, in 2 weeks for incision check, and in 6 weeks for postpartum exam.    Millie Contreras DO  2025  9:02 AM    Electronically signed by Millie Contreras DO on 2025 12:08 PM         REVIEWER COMMENTS

## 2025-02-08 NOTE — PROGRESS NOTES
Reviewed self care w / mom, she verbalizes understanding of instructions reviewed. Encourage to follow up w/ MDs as directed and w/ questions/concerns. Baby remains in [place] [facility] now, mom pumping well and visits daily. Has appt for bp check next week and sx of PISHAHLA reviewed, enc to join ct

## 2025-06-23 NOTE — H&P
Clinic Note     Assessment/Plan:  30 year old female    Left deQuervain's tenosynovitis - Surgical and non-surgical treatment options were reviewed with the patient. Patient elected to proceed with a corticosteroid injection plus thumb spica bracing.   CSI # 1 was performed today    Follow Up: 6-8 weeks should symptoms not resolve fully    Procedure:  Consent was obtained.  The skin was prepped with ChloraPrep.  1 mL of 6 mg betamethasone and 1 mL of 1% of lidocaine was injected into the first dorsal compartment.  Hemostasis was noted.  Band-Aid was applied.  No complications were encountered.    Diagnostic Studies:     XR Left Wrist 3V: No fractures dislocations or osseous abnormalities.     Physical Exam:    Ht 4' 11\" (1.499 m)   Wt 174 lb (78.9 kg)   BMI 35.14 kg/m²     Constitutional: NAD. AOx3. Well-developed and Well-nourished.   Psychiatric: Normal mood/ affect/ behavior. Judgment and thought content normal.     Left Upper Extremity:   Inspection: Skin Intact. No skin lesions. No gross deformity.   Palpation: TTP over first dorsal compartment.   Motion: Elbow: normal bilateral symmetric ext/flex  Wrist: normal bilateral symmetric ext/flex/sup/pro  Finger: full composite fist   Neurovascular Normal perfused hand. Sensation is intact light touch in the median, ulnar, and radial sensory nerve distribution.  Motor function is intact AIN, PIN, and ulnar motor nerve.     Special Tests:  + Finkelstein's test.  Wrist extension and flexion is symmetrically normal.      CC: Left wrist pain    HPI: This 30 year old RHD female presents with radial sided wrist pain.  Patient reports it started May 5, 2025.  She describes the pain as burning, aching, shooting, locking, throbbing, sharp in nature.  She has done OT cupping, dry needling brace Motrin ice and heat therapy.  Pain is severe.  Pain wakes her up at nighttime.  She recently had a baby who is now 4 months old.    Occupation: Stay-at-home mom    Past Medical  History[1]  Past Surgical History[2]  Current Medications[3]  Allergies[4]  Family History[5]  Social History     Occupational History    Not on file   Tobacco Use    Smoking status: Never    Smokeless tobacco: Never   Vaping Use    Vaping status: Never Used   Substance and Sexual Activity    Alcohol use: Not Currently     Comment: socially     Drug use: No    Sexual activity: Not on file        Review of Systems (negative unless bolded):  General: fevers, chills, fatigue  CV:  chest pain, palpitations, leg swelling  Msk: bodyaches, neck pain, neck stiffness  Skin: rashes, open wounds, nonhealing ulcers  Hem: bleeds easily, bruise easily, immunocompromised  Neuro: dizziness, light headedness, headaches  Psych: anxious, depressed, anger issues      Gildardo Rm MD   Hand, Wrist, & Elbow Surgery  [email or web address]  t: [phone]  f: [phone]         [1]   Past Medical History:   Asthma    Exersice Induced Asthma    ASTHMA    Asthma (HCC)    Essential hypertension    Gestational diabetes (HCC)    diet controlled    Infertility, female    Polycystic ovary syndrome     per (Dr. Cuevas)    Scoliosis   [2]   Past Surgical History:  Procedure Laterality Date    Hip arthro w/labral repair Left 03/2020    Ivf package      Salpingectomy Right 04/2023    ectopic pregnancy   [3]   Current Outpatient Medications   Medication Sig Dispense Refill    prenatal vitamin with DHA 27-0.8-228 MG Oral Cap Take 1 capsule by mouth daily.      EPINEPHrine (AUVI-Q) 0.3 MG/0.3ML Injection Solution Auto-injector Inject 0.3 mL (1 each total) as directed as needed. For anaphylaxis and call 911, [phone] 1 each 3    Albuterol Sulfate HFA (PROAIR HFA) 108 (90 Base) MCG/ACT Inhalation Aero Soln INHALE 2 PUFFS BY MOUTH INTO THE LUNGS EVERY 6 HOURS AS NEEDED FOR WHEEZING 8.5 g 1    oxyCODONE 5 MG Oral Tab Take 1 tablet (5 mg total) by mouth every 6 (six) hours as needed for Pain (breakthrough pain despite ibuprofen and  acetaminophen). 9 tablet 0    gabapentin 300 MG Oral Cap Take 1 capsule (300 mg total) by mouth every 8 (eight) hours as needed (For breakthrough pain despite ibuprofen and acetaminophen). 9 capsule 0   [4]   Allergies  Allergen Reactions    Cucumber OTHER (SEE COMMENTS)     Hoarseness    Grass     Plum     Tree, Oak     Watermelon     Latex ITCHING    Shrimp ITCHING   [5]   Family History  Problem Relation Age of Onset    Lipids Father     Diabetes Father     Heart Disorder Mother     Lipids Mother     Hypertension Mother     Hypertension Maternal Grandfather     Heart Disorder Maternal Grandfather     Lipids Maternal Grandfather     Other (Other) Maternal Grandfather         Kidney Disease    Hypertension Maternal Grandmother     Diabetes Paternal Grandmother     Heart Disorder Paternal Grandmother     Hypertension Paternal Grandmother     Lipids Paternal Grandmother     Heart Disease Paternal Grandmother         stroke    High Blood Pressure Paternal Grandmother     Heart Disorder Paternal Grandfather     Hypertension Paternal Grandfather     Lipids Paternal Grandfather     High Blood Pressure Paternal Grandfather     High Cholesterol Paternal Grandfather

## (undated) NOTE — ED AVS SNAPSHOT
Brayton Gareth   MRN: [id number]    Department:  [facility] Emergency Department   Date of Visit:  1/28/2018           Disclosure     Insurance plans vary and the physician(s) referred by the ER may not be covered by your plan.  Please co tell this physician (or your personal doctor if your instructions are to return to your personal doctor) about any new or lasting problems. The primary care or specialist physician will see patients referred from the [facility] Emergency Department.  Naresh Bess

## (undated) NOTE — LETTER
January 28, 2018    Patient: Courtney Wright   Date of Visit: 1/28/2018       To Whom It May Concern:    Courtney Wright was seen and treated in our emergency department on 1/28/2018.  She should not return to school until she has been f

## (undated) NOTE — LETTER
Date & Time: 5/13/2022, 2:46 PM  Patient: Marie Brooke  Encounter Provider(s):    Dimas Wallis MD       To Whom It May Concern:    Cain Raines was seen and treated in our department on 5/13/2022. She will be on crutches and nonweightbearing and therefore will need to be off work until seen and evaluated by orthopedics and deemed safe for return.   She will be following up with both orthopedics and occupational health    If you have any questions or concerns, please do not hesitate to call.        _____________________________  Physician/APC Signature

## (undated) NOTE — LETTER
1/7/2025        Aury Abarca        [address] [address]         To Whom It May Concern,  Aury Abarca (YOB: 1995) is a patient under my care.  Due to pregnancy related complications, I recommend that she only attend school or work from home starting today, 1/7/25.  If unable to complete work/school at home, I recommend early leave until delivery.     Sincerely,      Millie Contreras DO  [address] [place] [id number]  Ph: [phone]    Document electronically signed by: Millie Contreras DO